# Patient Record
Sex: MALE | Race: WHITE | NOT HISPANIC OR LATINO | Employment: OTHER | URBAN - METROPOLITAN AREA
[De-identification: names, ages, dates, MRNs, and addresses within clinical notes are randomized per-mention and may not be internally consistent; named-entity substitution may affect disease eponyms.]

---

## 2017-06-16 ENCOUNTER — OFFICE VISIT - HEALTHEAST (OUTPATIENT)
Dept: FAMILY MEDICINE | Facility: CLINIC | Age: 67
End: 2017-06-16

## 2017-06-16 DIAGNOSIS — R50.9 FEVER AND CHILLS: ICD-10-CM

## 2017-06-16 ASSESSMENT — MIFFLIN-ST. JEOR: SCORE: 1937.46

## 2017-06-30 ENCOUNTER — OFFICE VISIT - HEALTHEAST (OUTPATIENT)
Dept: FAMILY MEDICINE | Facility: CLINIC | Age: 67
End: 2017-06-30

## 2017-06-30 ENCOUNTER — AMBULATORY - HEALTHEAST (OUTPATIENT)
Dept: FAMILY MEDICINE | Facility: CLINIC | Age: 67
End: 2017-06-30

## 2017-06-30 DIAGNOSIS — R73.01 IMPAIRED FASTING GLUCOSE: ICD-10-CM

## 2017-06-30 DIAGNOSIS — Z12.5 PROSTATE CANCER SCREENING: ICD-10-CM

## 2017-06-30 DIAGNOSIS — Z00.00 ROUTINE GENERAL MEDICAL EXAMINATION AT A HEALTH CARE FACILITY: ICD-10-CM

## 2017-06-30 DIAGNOSIS — R50.9 FEBRILE ILLNESS: ICD-10-CM

## 2017-06-30 DIAGNOSIS — E78.5 HYPERLIPIDEMIA: ICD-10-CM

## 2017-06-30 LAB
CHOLEST SERPL-MCNC: 237 MG/DL
FASTING STATUS PATIENT QL REPORTED: YES
HBA1C MFR BLD: 6.3 % (ref 3.5–6)
HDLC SERPL-MCNC: 39 MG/DL
LDLC SERPL CALC-MCNC: 162 MG/DL
PSA SERPL-MCNC: 1.2 NG/ML (ref 0–4.5)
TRIGL SERPL-MCNC: 181 MG/DL

## 2017-06-30 ASSESSMENT — MIFFLIN-ST. JEOR: SCORE: 1915.68

## 2017-07-04 ENCOUNTER — COMMUNICATION - HEALTHEAST (OUTPATIENT)
Dept: FAMILY MEDICINE | Facility: CLINIC | Age: 67
End: 2017-07-04

## 2017-07-05 ENCOUNTER — COMMUNICATION - HEALTHEAST (OUTPATIENT)
Dept: FAMILY MEDICINE | Facility: CLINIC | Age: 67
End: 2017-07-05

## 2017-10-09 ENCOUNTER — COMMUNICATION - HEALTHEAST (OUTPATIENT)
Dept: LAB | Facility: CLINIC | Age: 67
End: 2017-10-09

## 2017-10-09 DIAGNOSIS — R73.03 PREDIABETES: ICD-10-CM

## 2017-10-09 DIAGNOSIS — E78.5 HYPERLIPIDEMIA: ICD-10-CM

## 2017-10-17 ENCOUNTER — AMBULATORY - HEALTHEAST (OUTPATIENT)
Dept: FAMILY MEDICINE | Facility: CLINIC | Age: 67
End: 2017-10-17

## 2017-10-17 ENCOUNTER — COMMUNICATION - HEALTHEAST (OUTPATIENT)
Dept: FAMILY MEDICINE | Facility: CLINIC | Age: 67
End: 2017-10-17

## 2017-10-17 ENCOUNTER — AMBULATORY - HEALTHEAST (OUTPATIENT)
Dept: LAB | Facility: CLINIC | Age: 67
End: 2017-10-17

## 2017-10-17 ENCOUNTER — AMBULATORY - HEALTHEAST (OUTPATIENT)
Dept: NURSING | Facility: CLINIC | Age: 67
End: 2017-10-17

## 2017-10-17 DIAGNOSIS — E78.5 HYPERLIPIDEMIA: ICD-10-CM

## 2017-10-17 DIAGNOSIS — Z23 NEED FOR VACCINATION: ICD-10-CM

## 2017-10-17 DIAGNOSIS — R73.03 PREDIABETES: ICD-10-CM

## 2017-10-17 LAB
CHOLEST SERPL-MCNC: 259 MG/DL
FASTING STATUS PATIENT QL REPORTED: YES
HBA1C MFR BLD: 6 % (ref 3.5–6)
HDLC SERPL-MCNC: 43 MG/DL
LDLC SERPL CALC-MCNC: 177 MG/DL
TRIGL SERPL-MCNC: 196 MG/DL

## 2017-11-14 ENCOUNTER — RECORDS - HEALTHEAST (OUTPATIENT)
Dept: ADMINISTRATIVE | Facility: OTHER | Age: 67
End: 2017-11-14

## 2017-12-26 ENCOUNTER — OFFICE VISIT - HEALTHEAST (OUTPATIENT)
Dept: FAMILY MEDICINE | Facility: CLINIC | Age: 67
End: 2017-12-26

## 2017-12-26 ENCOUNTER — AMBULATORY - HEALTHEAST (OUTPATIENT)
Dept: FAMILY MEDICINE | Facility: CLINIC | Age: 67
End: 2017-12-26

## 2017-12-26 DIAGNOSIS — G47.33 OSA (OBSTRUCTIVE SLEEP APNEA): ICD-10-CM

## 2017-12-26 DIAGNOSIS — M10.9 GOUT: ICD-10-CM

## 2017-12-26 DIAGNOSIS — E78.5 HYPERLIPIDEMIA: ICD-10-CM

## 2017-12-26 DIAGNOSIS — R73.01 IMPAIRED FASTING GLUCOSE: ICD-10-CM

## 2017-12-26 LAB
CHOLEST SERPL-MCNC: 159 MG/DL
FASTING STATUS PATIENT QL REPORTED: YES
HBA1C MFR BLD: 6.5 % (ref 3.5–6)
HDLC SERPL-MCNC: 32 MG/DL
LDLC SERPL CALC-MCNC: 96 MG/DL
TRIGL SERPL-MCNC: 155 MG/DL

## 2018-03-29 ENCOUNTER — COMMUNICATION - HEALTHEAST (OUTPATIENT)
Dept: FAMILY MEDICINE | Facility: CLINIC | Age: 68
End: 2018-03-29

## 2018-03-29 RX ORDER — NAPROXEN 250 MG/1
TABLET ORAL
Qty: 12 TABLET | Refills: 0 | Status: SHIPPED | OUTPATIENT
Start: 2018-03-29

## 2018-07-10 ENCOUNTER — OFFICE VISIT - HEALTHEAST (OUTPATIENT)
Dept: FAMILY MEDICINE | Facility: CLINIC | Age: 68
End: 2018-07-10

## 2018-07-10 DIAGNOSIS — E78.5 HYPERLIPIDEMIA: ICD-10-CM

## 2018-07-10 DIAGNOSIS — M10.9 ACUTE GOUT, UNSPECIFIED CAUSE, UNSPECIFIED SITE: ICD-10-CM

## 2018-07-10 DIAGNOSIS — Z12.5 SCREENING FOR PROSTATE CANCER: ICD-10-CM

## 2018-07-10 DIAGNOSIS — Z12.11 SCREEN FOR COLON CANCER: ICD-10-CM

## 2018-07-10 DIAGNOSIS — G47.33 OSA (OBSTRUCTIVE SLEEP APNEA): ICD-10-CM

## 2018-07-10 DIAGNOSIS — R73.01 IMPAIRED FASTING GLUCOSE: ICD-10-CM

## 2018-07-10 LAB
ALBUMIN SERPL-MCNC: 4.3 G/DL (ref 3.5–5)
ALP SERPL-CCNC: 83 U/L (ref 45–120)
ALT SERPL W P-5'-P-CCNC: 20 U/L (ref 0–45)
ANION GAP SERPL CALCULATED.3IONS-SCNC: 11 MMOL/L (ref 5–18)
AST SERPL W P-5'-P-CCNC: 20 U/L (ref 0–40)
BILIRUB SERPL-MCNC: 0.8 MG/DL (ref 0–1)
BUN SERPL-MCNC: 16 MG/DL (ref 8–22)
CALCIUM SERPL-MCNC: 10.1 MG/DL (ref 8.5–10.5)
CHLORIDE BLD-SCNC: 106 MMOL/L (ref 98–107)
CHOLEST SERPL-MCNC: 215 MG/DL
CO2 SERPL-SCNC: 26 MMOL/L (ref 22–31)
CREAT SERPL-MCNC: 1.11 MG/DL (ref 0.7–1.3)
FASTING STATUS PATIENT QL REPORTED: YES
GFR SERPL CREATININE-BSD FRML MDRD: >60 ML/MIN/1.73M2
GLUCOSE BLD-MCNC: 102 MG/DL (ref 70–125)
HBA1C MFR BLD: 6.5 % (ref 3.5–6)
HDLC SERPL-MCNC: 43 MG/DL
LDLC SERPL CALC-MCNC: 122 MG/DL
POTASSIUM BLD-SCNC: 4.9 MMOL/L (ref 3.5–5)
PROT SERPL-MCNC: 7 G/DL (ref 6–8)
PSA SERPL-MCNC: 1.2 NG/ML (ref 0–4.5)
SODIUM SERPL-SCNC: 143 MMOL/L (ref 136–145)
TRIGL SERPL-MCNC: 248 MG/DL
URATE SERPL-MCNC: 8.2 MG/DL (ref 3–8)

## 2018-07-10 ASSESSMENT — MIFFLIN-ST. JEOR: SCORE: 1928.38

## 2018-08-02 ENCOUNTER — COMMUNICATION - HEALTHEAST (OUTPATIENT)
Dept: FAMILY MEDICINE | Facility: CLINIC | Age: 68
End: 2018-08-02

## 2018-08-03 ENCOUNTER — COMMUNICATION - HEALTHEAST (OUTPATIENT)
Dept: SCHEDULING | Facility: CLINIC | Age: 68
End: 2018-08-03

## 2018-09-03 ENCOUNTER — COMMUNICATION - HEALTHEAST (OUTPATIENT)
Dept: FAMILY MEDICINE | Facility: CLINIC | Age: 68
End: 2018-09-03

## 2018-09-04 ENCOUNTER — COMMUNICATION - HEALTHEAST (OUTPATIENT)
Dept: SCHEDULING | Facility: CLINIC | Age: 68
End: 2018-09-04

## 2018-09-04 ENCOUNTER — OFFICE VISIT - HEALTHEAST (OUTPATIENT)
Dept: FAMILY MEDICINE | Facility: CLINIC | Age: 68
End: 2018-09-04

## 2018-09-04 ENCOUNTER — RECORDS - HEALTHEAST (OUTPATIENT)
Dept: GENERAL RADIOLOGY | Facility: CLINIC | Age: 68
End: 2018-09-04

## 2018-09-04 DIAGNOSIS — M10.9 GOUT: ICD-10-CM

## 2018-09-04 DIAGNOSIS — M79.672 PAIN IN LEFT FOOT: ICD-10-CM

## 2018-09-04 DIAGNOSIS — M79.672 LEFT FOOT PAIN: ICD-10-CM

## 2018-09-04 DIAGNOSIS — M25.50 ARTHRALGIA: ICD-10-CM

## 2018-09-04 DIAGNOSIS — M79.10 MYALGIA: ICD-10-CM

## 2018-09-04 LAB
C REACTIVE PROTEIN LHE: 5.3 MG/DL (ref 0–0.8)
CK SERPL-CCNC: 179 U/L (ref 30–190)
ERYTHROCYTE [DISTWIDTH] IN BLOOD BY AUTOMATED COUNT: 12.6 % (ref 11–14.5)
ERYTHROCYTE [SEDIMENTATION RATE] IN BLOOD BY WESTERGREN METHOD: 43 MM/HR (ref 0–15)
HCT VFR BLD AUTO: 38.7 % (ref 40–54)
HGB BLD-MCNC: 13.3 G/DL (ref 14–18)
MCH RBC QN AUTO: 30.1 PG (ref 27–34)
MCHC RBC AUTO-ENTMCNC: 34.4 G/DL (ref 32–36)
MCV RBC AUTO: 87 FL (ref 80–100)
PLATELET # BLD AUTO: 236 THOU/UL (ref 140–440)
PMV BLD AUTO: 7.3 FL (ref 7–10)
RBC # BLD AUTO: 4.42 MILL/UL (ref 4.4–6.2)
RHEUMATOID FACT SERPL-ACNC: <15 IU/ML (ref 0–30)
URATE SERPL-MCNC: 5.7 MG/DL (ref 3–8)
WBC: 6.1 THOU/UL (ref 4–11)

## 2018-09-05 ENCOUNTER — COMMUNICATION - HEALTHEAST (OUTPATIENT)
Dept: FAMILY MEDICINE | Facility: CLINIC | Age: 68
End: 2018-09-05

## 2018-09-05 LAB — B BURGDOR IGG+IGM SER QL: 0.02 INDEX VALUE

## 2018-09-06 LAB — ANA SER QL: 0.1 U

## 2018-09-08 LAB
A PHAGOCYTOPH DNA BLD QL NAA+PROBE: NOT DETECTED
E CHAFFEENSIS DNA BLD QL NAA+PROBE: NOT DETECTED
E EWINGII DNA SPEC QL NAA+PROBE: NOT DETECTED
EHRLICHIA DNA SPEC QL NAA+PROBE: NOT DETECTED

## 2018-09-13 ENCOUNTER — COMMUNICATION - HEALTHEAST (OUTPATIENT)
Dept: SCHEDULING | Facility: CLINIC | Age: 68
End: 2018-09-13

## 2018-09-13 ENCOUNTER — COMMUNICATION - HEALTHEAST (OUTPATIENT)
Dept: FAMILY MEDICINE | Facility: CLINIC | Age: 68
End: 2018-09-13

## 2018-09-13 ENCOUNTER — OFFICE VISIT - HEALTHEAST (OUTPATIENT)
Dept: FAMILY MEDICINE | Facility: CLINIC | Age: 68
End: 2018-09-13

## 2018-09-13 ENCOUNTER — RECORDS - HEALTHEAST (OUTPATIENT)
Dept: GENERAL RADIOLOGY | Facility: CLINIC | Age: 68
End: 2018-09-13

## 2018-09-13 DIAGNOSIS — R53.83 OTHER FATIGUE: ICD-10-CM

## 2018-09-13 DIAGNOSIS — G47.33 OSA (OBSTRUCTIVE SLEEP APNEA): ICD-10-CM

## 2018-09-13 DIAGNOSIS — R61 NIGHT SWEATS: ICD-10-CM

## 2018-09-13 DIAGNOSIS — R53.83 FATIGUE, UNSPECIFIED TYPE: ICD-10-CM

## 2018-09-13 DIAGNOSIS — R61 GENERALIZED HYPERHIDROSIS: ICD-10-CM

## 2018-09-13 DIAGNOSIS — M25.50 POLYARTHRALGIA: ICD-10-CM

## 2018-09-13 DIAGNOSIS — M35.3 POLYMYALGIA (H): ICD-10-CM

## 2018-09-13 LAB
ALBUMIN SERPL-MCNC: 3.9 G/DL (ref 3.5–5)
ALBUMIN UR-MCNC: NEGATIVE MG/DL
ALP SERPL-CCNC: 82 U/L (ref 45–120)
ALT SERPL W P-5'-P-CCNC: 19 U/L (ref 0–45)
ANION GAP SERPL CALCULATED.3IONS-SCNC: 10 MMOL/L (ref 5–18)
APPEARANCE UR: CLEAR
AST SERPL W P-5'-P-CCNC: 12 U/L (ref 0–40)
BASOPHILS # BLD AUTO: 0 THOU/UL (ref 0–0.2)
BASOPHILS NFR BLD AUTO: 0 % (ref 0–2)
BILIRUB SERPL-MCNC: 0.6 MG/DL (ref 0–1)
BILIRUB UR QL STRIP: NEGATIVE
BUN SERPL-MCNC: 19 MG/DL (ref 8–22)
CALCIUM SERPL-MCNC: 10.2 MG/DL (ref 8.5–10.5)
CHLORIDE BLD-SCNC: 103 MMOL/L (ref 98–107)
CO2 SERPL-SCNC: 28 MMOL/L (ref 22–31)
COLOR UR AUTO: YELLOW
CREAT SERPL-MCNC: 1.19 MG/DL (ref 0.7–1.3)
EOSINOPHIL # BLD AUTO: 0.2 THOU/UL (ref 0–0.4)
EOSINOPHIL NFR BLD AUTO: 3 % (ref 0–6)
ERYTHROCYTE [DISTWIDTH] IN BLOOD BY AUTOMATED COUNT: 12.7 % (ref 11–14.5)
GFR SERPL CREATININE-BSD FRML MDRD: >60 ML/MIN/1.73M2
GLUCOSE BLD-MCNC: 115 MG/DL (ref 70–125)
GLUCOSE UR STRIP-MCNC: NEGATIVE MG/DL
HCT VFR BLD AUTO: 45 % (ref 40–54)
HGB BLD-MCNC: 15.2 G/DL (ref 14–18)
HGB UR QL STRIP: NEGATIVE
KETONES UR STRIP-MCNC: NEGATIVE MG/DL
LEUKOCYTE ESTERASE UR QL STRIP: NEGATIVE
LYMPHOCYTES # BLD AUTO: 1.6 THOU/UL (ref 0.8–4.4)
LYMPHOCYTES NFR BLD AUTO: 23 % (ref 20–40)
MCH RBC QN AUTO: 30.3 PG (ref 27–34)
MCHC RBC AUTO-ENTMCNC: 33.9 G/DL (ref 32–36)
MCV RBC AUTO: 89 FL (ref 80–100)
MONOCYTES # BLD AUTO: 0.6 THOU/UL (ref 0–0.9)
MONOCYTES NFR BLD AUTO: 9 % (ref 2–10)
NEUTROPHILS # BLD AUTO: 4.6 THOU/UL (ref 2–7.7)
NEUTROPHILS NFR BLD AUTO: 65 % (ref 50–70)
NITRATE UR QL: NEGATIVE
PH UR STRIP: 5.5 [PH] (ref 5–8)
PLATELET # BLD AUTO: 255 THOU/UL (ref 140–440)
PMV BLD AUTO: 7.2 FL (ref 7–10)
POTASSIUM BLD-SCNC: 4.9 MMOL/L (ref 3.5–5)
PROT SERPL-MCNC: 6.7 G/DL (ref 6–8)
RBC # BLD AUTO: 5.03 MILL/UL (ref 4.4–6.2)
SODIUM SERPL-SCNC: 141 MMOL/L (ref 136–145)
SP GR UR STRIP: 1.01 (ref 1–1.03)
TSH SERPL DL<=0.005 MIU/L-ACNC: 1.4 UIU/ML (ref 0.3–5)
UROBILINOGEN UR STRIP-ACNC: NORMAL
WBC: 7 THOU/UL (ref 4–11)

## 2018-09-13 ASSESSMENT — MIFFLIN-ST. JEOR: SCORE: 1922.03

## 2018-09-14 LAB
ATRIAL RATE - MUSE: 55 BPM
DIASTOLIC BLOOD PRESSURE - MUSE: NORMAL MMHG
INTERPRETATION ECG - MUSE: NORMAL
P AXIS - MUSE: 47 DEGREES
PR INTERVAL - MUSE: 152 MS
QRS DURATION - MUSE: 92 MS
QT - MUSE: 412 MS
QTC - MUSE: 394 MS
R AXIS - MUSE: -14 DEGREES
SYSTOLIC BLOOD PRESSURE - MUSE: NORMAL MMHG
T AXIS - MUSE: 43 DEGREES
VENTRICULAR RATE- MUSE: 55 BPM

## 2018-09-15 LAB
B19V IGG SER IA-ACNC: 1.17 IV
B19V IGM SER IA-ACNC: 0.21 IV

## 2018-09-16 LAB
EBV EA-D IGG SER-ACNC: <0.2 AI (ref 0–0.8)
EBV NA IGG SER IA-ACNC: >8 AI (ref 0–0.8)
EBV VCA IGG SER IA-ACNC: 7.2 AI (ref 0–0.8)

## 2018-09-21 ENCOUNTER — COMMUNICATION - HEALTHEAST (OUTPATIENT)
Dept: FAMILY MEDICINE | Facility: CLINIC | Age: 68
End: 2018-09-21

## 2018-10-02 ENCOUNTER — OFFICE VISIT - HEALTHEAST (OUTPATIENT)
Dept: FAMILY MEDICINE | Facility: CLINIC | Age: 68
End: 2018-10-02

## 2018-10-02 ENCOUNTER — AMBULATORY - HEALTHEAST (OUTPATIENT)
Dept: FAMILY MEDICINE | Facility: CLINIC | Age: 68
End: 2018-10-02

## 2018-10-02 DIAGNOSIS — M10.9 GOUT: ICD-10-CM

## 2018-10-02 DIAGNOSIS — R79.82 ELEVATED C-REACTIVE PROTEIN (CRP): ICD-10-CM

## 2018-10-02 DIAGNOSIS — R53.81 MALAISE: ICD-10-CM

## 2018-10-02 LAB
BASOPHILS # BLD AUTO: 0 THOU/UL (ref 0–0.2)
BASOPHILS NFR BLD AUTO: 0 % (ref 0–2)
C REACTIVE PROTEIN LHE: 1.4 MG/DL (ref 0–0.8)
EOSINOPHIL # BLD AUTO: 0.2 THOU/UL (ref 0–0.4)
EOSINOPHIL NFR BLD AUTO: 3 % (ref 0–6)
ERYTHROCYTE [DISTWIDTH] IN BLOOD BY AUTOMATED COUNT: 13 % (ref 11–14.5)
ERYTHROCYTE [SEDIMENTATION RATE] IN BLOOD BY WESTERGREN METHOD: 28 MM/HR (ref 0–15)
HCT VFR BLD AUTO: 42.1 % (ref 40–54)
HGB BLD-MCNC: 14.1 G/DL (ref 14–18)
LYMPHOCYTES # BLD AUTO: 1.3 THOU/UL (ref 0.8–4.4)
LYMPHOCYTES NFR BLD AUTO: 25 % (ref 20–40)
MCH RBC QN AUTO: 29.8 PG (ref 27–34)
MCHC RBC AUTO-ENTMCNC: 33.6 G/DL (ref 32–36)
MCV RBC AUTO: 89 FL (ref 80–100)
MONOCYTES # BLD AUTO: 0.4 THOU/UL (ref 0–0.9)
MONOCYTES NFR BLD AUTO: 8 % (ref 2–10)
NEUTROPHILS # BLD AUTO: 3.3 THOU/UL (ref 2–7.7)
NEUTROPHILS NFR BLD AUTO: 65 % (ref 50–70)
PLATELET # BLD AUTO: 221 THOU/UL (ref 140–440)
PMV BLD AUTO: 7.2 FL (ref 7–10)
RBC # BLD AUTO: 4.75 MILL/UL (ref 4.4–6.2)
WBC: 5.1 THOU/UL (ref 4–11)

## 2018-10-05 ENCOUNTER — COMMUNICATION - HEALTHEAST (OUTPATIENT)
Dept: FAMILY MEDICINE | Facility: CLINIC | Age: 68
End: 2018-10-05

## 2018-10-05 DIAGNOSIS — E78.5 HYPERLIPIDEMIA: ICD-10-CM

## 2018-10-10 ENCOUNTER — OFFICE VISIT - HEALTHEAST (OUTPATIENT)
Dept: RHEUMATOLOGY | Facility: CLINIC | Age: 68
End: 2018-10-10

## 2018-10-10 DIAGNOSIS — M25.50 POLYARTHRALGIA: ICD-10-CM

## 2018-10-10 DIAGNOSIS — M35.3 POLYMYALGIA (H): ICD-10-CM

## 2018-10-10 LAB
ALBUMIN SERPL-MCNC: 4 G/DL (ref 3.5–5)
ALT SERPL W P-5'-P-CCNC: 18 U/L (ref 0–45)
BASOPHILS # BLD AUTO: 0 THOU/UL (ref 0–0.2)
BASOPHILS NFR BLD AUTO: 1 % (ref 0–2)
C REACTIVE PROTEIN LHE: 0.2 MG/DL (ref 0–0.8)
CREAT SERPL-MCNC: 1.06 MG/DL (ref 0.7–1.3)
EOSINOPHIL # BLD AUTO: 0.1 THOU/UL (ref 0–0.4)
EOSINOPHIL NFR BLD AUTO: 2 % (ref 0–6)
ERYTHROCYTE [DISTWIDTH] IN BLOOD BY AUTOMATED COUNT: 13.1 % (ref 11–14.5)
ERYTHROCYTE [SEDIMENTATION RATE] IN BLOOD BY WESTERGREN METHOD: 14 MM/HR (ref 0–15)
GFR SERPL CREATININE-BSD FRML MDRD: >60 ML/MIN/1.73M2
HCT VFR BLD AUTO: 44.8 % (ref 40–54)
HGB BLD-MCNC: 14.4 G/DL (ref 14–18)
LYMPHOCYTES # BLD AUTO: 1.4 THOU/UL (ref 0.8–4.4)
LYMPHOCYTES NFR BLD AUTO: 26 % (ref 20–40)
MCH RBC QN AUTO: 29.8 PG (ref 27–34)
MCHC RBC AUTO-ENTMCNC: 32.1 G/DL (ref 32–36)
MCV RBC AUTO: 93 FL (ref 80–100)
MONOCYTES # BLD AUTO: 0.5 THOU/UL (ref 0–0.9)
MONOCYTES NFR BLD AUTO: 9 % (ref 2–10)
NEUTROPHILS # BLD AUTO: 3.3 THOU/UL (ref 2–7.7)
NEUTROPHILS NFR BLD AUTO: 63 % (ref 50–70)
PLATELET # BLD AUTO: 271 THOU/UL (ref 140–440)
PMV BLD AUTO: 9.7 FL (ref 8.5–12.5)
RBC # BLD AUTO: 4.84 MILL/UL (ref 4.4–6.2)
WBC: 5.3 THOU/UL (ref 4–11)

## 2018-10-10 ASSESSMENT — MIFFLIN-ST. JEOR: SCORE: 1930.2

## 2018-10-11 LAB
CCP AB SER IA-ACNC: 0.6 U/ML
HBV SURFACE AG SERPL QL IA: NEGATIVE
HCV AB SERPL QL IA: NEGATIVE

## 2018-10-12 LAB — ANCA IGG TITR SER IF: NORMAL {TITER}

## 2018-10-14 ENCOUNTER — COMMUNICATION - HEALTHEAST (OUTPATIENT)
Dept: FAMILY MEDICINE | Facility: CLINIC | Age: 68
End: 2018-10-14

## 2018-10-14 DIAGNOSIS — M10.9 GOUT: ICD-10-CM

## 2019-03-13 ENCOUNTER — COMMUNICATION - HEALTHEAST (OUTPATIENT)
Dept: FAMILY MEDICINE | Facility: CLINIC | Age: 69
End: 2019-03-13

## 2019-05-09 ENCOUNTER — OFFICE VISIT - HEALTHEAST (OUTPATIENT)
Dept: RHEUMATOLOGY | Facility: CLINIC | Age: 69
End: 2019-05-09

## 2019-05-09 DIAGNOSIS — M35.3 POLYMYALGIA (H): ICD-10-CM

## 2019-07-08 ENCOUNTER — AMBULATORY - HEALTHEAST (OUTPATIENT)
Dept: FAMILY MEDICINE | Facility: CLINIC | Age: 69
End: 2019-07-08

## 2019-07-15 ENCOUNTER — OFFICE VISIT - HEALTHEAST (OUTPATIENT)
Dept: FAMILY MEDICINE | Facility: CLINIC | Age: 69
End: 2019-07-15

## 2019-07-15 ENCOUNTER — COMMUNICATION - HEALTHEAST (OUTPATIENT)
Dept: FAMILY MEDICINE | Facility: CLINIC | Age: 69
End: 2019-07-15

## 2019-07-15 DIAGNOSIS — R73.01 IMPAIRED FASTING GLUCOSE: ICD-10-CM

## 2019-07-15 DIAGNOSIS — M10.9 GOUT: ICD-10-CM

## 2019-07-15 DIAGNOSIS — T88.1XXS: ICD-10-CM

## 2019-07-15 DIAGNOSIS — Z12.5 SCREENING FOR PROSTATE CANCER: ICD-10-CM

## 2019-07-15 DIAGNOSIS — B35.1 TOENAIL FUNGUS: ICD-10-CM

## 2019-07-15 DIAGNOSIS — E78.5 HYPERLIPIDEMIA: ICD-10-CM

## 2019-07-15 DIAGNOSIS — Z12.11 SCREEN FOR COLON CANCER: ICD-10-CM

## 2019-07-15 LAB
ALBUMIN SERPL-MCNC: 4.3 G/DL (ref 3.5–5)
ALP SERPL-CCNC: 87 U/L (ref 45–120)
ALT SERPL W P-5'-P-CCNC: 27 U/L (ref 0–45)
ANION GAP SERPL CALCULATED.3IONS-SCNC: 11 MMOL/L (ref 5–18)
AST SERPL W P-5'-P-CCNC: 23 U/L (ref 0–40)
BILIRUB SERPL-MCNC: 0.7 MG/DL (ref 0–1)
BUN SERPL-MCNC: 17 MG/DL (ref 8–22)
CALCIUM SERPL-MCNC: 9.9 MG/DL (ref 8.5–10.5)
CHLORIDE BLD-SCNC: 104 MMOL/L (ref 98–107)
CHOLEST SERPL-MCNC: 201 MG/DL
CO2 SERPL-SCNC: 27 MMOL/L (ref 22–31)
CREAT SERPL-MCNC: 1.14 MG/DL (ref 0.7–1.3)
CREAT UR-MCNC: 30.2 MG/DL
FASTING STATUS PATIENT QL REPORTED: YES
GFR SERPL CREATININE-BSD FRML MDRD: >60 ML/MIN/1.73M2
GLUCOSE BLD-MCNC: 126 MG/DL (ref 70–125)
HBA1C MFR BLD: 6.7 % (ref 3.5–6)
HDLC SERPL-MCNC: 44 MG/DL
LDLC SERPL CALC-MCNC: 106 MG/DL
MICROALBUMIN UR-MCNC: <0.5 MG/DL (ref 0–1.99)
MICROALBUMIN/CREAT UR: NORMAL MG/G
POTASSIUM BLD-SCNC: 4.6 MMOL/L (ref 3.5–5)
PROT SERPL-MCNC: 6.6 G/DL (ref 6–8)
PSA SERPL-MCNC: 1.1 NG/ML (ref 0–4.5)
SODIUM SERPL-SCNC: 142 MMOL/L (ref 136–145)
TRIGL SERPL-MCNC: 257 MG/DL
URATE SERPL-MCNC: 5.1 MG/DL (ref 3–8)

## 2019-07-15 RX ORDER — COLCHICINE 0.6 MG/1
0.6 TABLET ORAL DAILY
Qty: 30 TABLET | Refills: 5 | Status: SHIPPED | OUTPATIENT
Start: 2019-07-15 | End: 2022-07-01

## 2019-07-15 ASSESSMENT — MIFFLIN-ST. JEOR: SCORE: 1911.61

## 2019-07-17 ENCOUNTER — COMMUNICATION - HEALTHEAST (OUTPATIENT)
Dept: FAMILY MEDICINE | Facility: CLINIC | Age: 69
End: 2019-07-17

## 2019-08-14 ENCOUNTER — RECORDS - HEALTHEAST (OUTPATIENT)
Dept: ADMINISTRATIVE | Facility: OTHER | Age: 69
End: 2019-08-14

## 2020-08-28 ENCOUNTER — OFFICE VISIT - HEALTHEAST (OUTPATIENT)
Dept: FAMILY MEDICINE | Facility: CLINIC | Age: 70
End: 2020-08-28

## 2020-08-28 DIAGNOSIS — E78.5 HYPERLIPIDEMIA, UNSPECIFIED HYPERLIPIDEMIA TYPE: ICD-10-CM

## 2020-08-28 DIAGNOSIS — M10.9 GOUT, UNSPECIFIED CAUSE, UNSPECIFIED CHRONICITY, UNSPECIFIED SITE: ICD-10-CM

## 2020-08-28 DIAGNOSIS — R73.01 IMPAIRED FASTING GLUCOSE: ICD-10-CM

## 2020-08-28 DIAGNOSIS — Z12.5 SCREENING FOR PROSTATE CANCER: ICD-10-CM

## 2020-08-28 DIAGNOSIS — M77.11 RIGHT LATERAL EPICONDYLITIS: ICD-10-CM

## 2020-08-28 DIAGNOSIS — Z00.00 ROUTINE GENERAL MEDICAL EXAMINATION AT A HEALTH CARE FACILITY: ICD-10-CM

## 2020-08-28 ASSESSMENT — ANXIETY QUESTIONNAIRES
1. FEELING NERVOUS, ANXIOUS, OR ON EDGE: NOT AT ALL
2. NOT BEING ABLE TO STOP OR CONTROL WORRYING: NOT AT ALL

## 2020-08-28 ASSESSMENT — MIFFLIN-ST. JEOR: SCORE: 1885.86

## 2020-08-31 ENCOUNTER — AMBULATORY - HEALTHEAST (OUTPATIENT)
Dept: LAB | Facility: CLINIC | Age: 70
End: 2020-08-31

## 2020-08-31 DIAGNOSIS — M10.9 GOUT, UNSPECIFIED CAUSE, UNSPECIFIED CHRONICITY, UNSPECIFIED SITE: ICD-10-CM

## 2020-08-31 DIAGNOSIS — R73.01 IMPAIRED FASTING GLUCOSE: ICD-10-CM

## 2020-08-31 DIAGNOSIS — E78.5 HYPERLIPIDEMIA, UNSPECIFIED HYPERLIPIDEMIA TYPE: ICD-10-CM

## 2020-08-31 DIAGNOSIS — Z12.5 SCREENING FOR PROSTATE CANCER: ICD-10-CM

## 2020-08-31 LAB
ALBUMIN SERPL-MCNC: 4.1 G/DL (ref 3.5–5)
ALP SERPL-CCNC: 88 U/L (ref 45–120)
ALT SERPL W P-5'-P-CCNC: 21 U/L (ref 0–45)
ANION GAP SERPL CALCULATED.3IONS-SCNC: 12 MMOL/L (ref 5–18)
AST SERPL W P-5'-P-CCNC: 20 U/L (ref 0–40)
BILIRUB SERPL-MCNC: 0.7 MG/DL (ref 0–1)
BUN SERPL-MCNC: 19 MG/DL (ref 8–22)
CALCIUM SERPL-MCNC: 9.6 MG/DL (ref 8.5–10.5)
CHLORIDE BLD-SCNC: 104 MMOL/L (ref 98–107)
CHOLEST SERPL-MCNC: 193 MG/DL
CO2 SERPL-SCNC: 24 MMOL/L (ref 22–31)
CREAT SERPL-MCNC: 1.15 MG/DL (ref 0.7–1.3)
FASTING STATUS PATIENT QL REPORTED: YES
GFR SERPL CREATININE-BSD FRML MDRD: >60 ML/MIN/1.73M2
GLUCOSE BLD-MCNC: 134 MG/DL (ref 70–125)
HBA1C MFR BLD: 6.7 %
HDLC SERPL-MCNC: 44 MG/DL
LDLC SERPL CALC-MCNC: 106 MG/DL
POTASSIUM BLD-SCNC: 4.1 MMOL/L (ref 3.5–5)
PROT SERPL-MCNC: 6.5 G/DL (ref 6–8)
PSA SERPL-MCNC: 1.3 NG/ML (ref 0–4.5)
SODIUM SERPL-SCNC: 140 MMOL/L (ref 136–145)
TRIGL SERPL-MCNC: 214 MG/DL
URATE SERPL-MCNC: 5.3 MG/DL (ref 3–8)

## 2020-09-25 ENCOUNTER — COMMUNICATION - HEALTHEAST (OUTPATIENT)
Dept: FAMILY MEDICINE | Facility: CLINIC | Age: 70
End: 2020-09-25

## 2020-09-25 DIAGNOSIS — E78.5 HYPERLIPIDEMIA: ICD-10-CM

## 2020-09-28 RX ORDER — ATORVASTATIN CALCIUM 10 MG/1
TABLET, FILM COATED ORAL
Qty: 90 TABLET | Refills: 3 | Status: SHIPPED | OUTPATIENT
Start: 2020-09-28 | End: 2021-09-20

## 2020-10-01 ENCOUNTER — COMMUNICATION - HEALTHEAST (OUTPATIENT)
Dept: FAMILY MEDICINE | Facility: CLINIC | Age: 70
End: 2020-10-01

## 2020-10-01 DIAGNOSIS — M10.9 GOUT: ICD-10-CM

## 2020-10-04 ENCOUNTER — COMMUNICATION - HEALTHEAST (OUTPATIENT)
Dept: FAMILY MEDICINE | Facility: CLINIC | Age: 70
End: 2020-10-04

## 2020-10-04 DIAGNOSIS — M10.9 GOUT: ICD-10-CM

## 2020-10-05 RX ORDER — ALLOPURINOL 100 MG/1
200 TABLET ORAL DAILY
Qty: 180 TABLET | Refills: 3 | Status: SHIPPED | OUTPATIENT
Start: 2020-10-05 | End: 2022-07-01

## 2020-10-08 ENCOUNTER — RECORDS - HEALTHEAST (OUTPATIENT)
Dept: ADMINISTRATIVE | Facility: OTHER | Age: 70
End: 2020-10-08

## 2021-05-30 NOTE — PATIENT INSTRUCTIONS - HE
Advance Directive  Patient s advance directive was discussed and I am comfortable with the patient s wishes.  Patient Education   Personalized Prevention Plan  You are due for the preventive services outlined below.  Your care team is available to assist you in scheduling these services.  If you have already completed any of these items, please share that information with your care team to update in your medical record.  Health Maintenance   Topic Date Due     ZOSTER VACCINES (3 of 3) 02/26/2019     INFLUENZA VACCINE RULE BASED (1) 08/01/2019     FALL RISK ASSESSMENT  09/04/2019     COLOGUARD  08/01/2021     ADVANCE DIRECTIVES DISCUSSED WITH PATIENT  07/10/2023     TD 18+ HE  10/15/2028     PNEUMOCOCCAL POLYSACCHARIDE VACCINE AGE 65 AND OVER  Completed     PNEUMOCOCCAL CONJUGATE VACCINE FOR ADULTS (PCV13 OR PREVNAR)  Completed

## 2021-05-30 NOTE — PROGRESS NOTES
Assessment and Plan:     Evaristo was seen today for annual wellness visit, nail problem and gout.    Screen for colon cancer  -     Ambulatory referral for Colonoscopy  -     Microalbumin, Random Urine    Hyperlipidemia  -     Lipid Cascade  -     Comprehensive Metabolic Panel  -     atorvastatin (LIPITOR) 10 MG tablet; Take 1 tablet (10 mg total) by mouth daily.  -     Microalbumin, Random Urine    Impaired fasting glucose  -     Glycosylated Hemoglobin A1c  -     Microalbumin, Random Urine    Screening for prostate cancer  -     PSA, Annual Screen (Prostatic-Specific Antigen)  -     Microalbumin, Random Urine    Gout  -     Uric Acid  -     allopurinol (ZYLOPRIM) 100 MG tablet; Take 2 tablets (200 mg total) by mouth daily.  -     colchicine 0.6 mg tablet; Take 1 tablet (0.6 mg total) by mouth daily.  -     Microalbumin, Random Urine    Toenail fungus    Reaction to varicella immunization, sequela      The patient's current medical problems were reviewed.    The following high BMI interventions were performed this visit: encouragement to exercise and weight monitoring  The following health maintenance schedule was reviewed with the patient and provided in printed form in the after visit summary:   Health Maintenance   Topic Date Due     ZOSTER VACCINES (3 of 3) 02/26/2019     INFLUENZA VACCINE RULE BASED (1) 08/01/2019     FALL RISK ASSESSMENT  09/04/2019     COLOGUARD  08/01/2021     ADVANCE DIRECTIVES DISCUSSED WITH PATIENT  07/10/2023     TD 18+ HE  10/15/2028     PNEUMOCOCCAL POLYSACCHARIDE VACCINE AGE 65 AND OVER  Completed     PNEUMOCOCCAL CONJUGATE VACCINE FOR ADULTS (PCV13 OR PREVNAR)  Completed        Subjective:   Chief Complaint: Evaristo Lindsey is an 69 y.o. male here for an Annual Wellness visit.   HPI: He reports he is overall doing well.  He has gout no recent attack is on suppression therapy.  He has sleep apnea uses CPAP with good symptom relief follows with sleep specialist.  Has upcoming  appointment dermatology for screening.  Has prediabetes with most recent A1c of 6.5 obtained 1 year ago.  Discussed the significance of this.  Patient inquired about continued use of aspirin, will wait for repeat blood sugar testing.  Discussed diet and exercise.  No symptoms of hyperglycemia.  Has toenail fungus affecting both great toes discussed extensively today.  Had a reaction to shingles vaccine.  Discussed extensively, elected to hold off on obtaining second shot at this point, discussed the uncertainty regarding the degree of protection from one vaccine alone he understands this.  He reports that he had fever and flulike symptoms that were significant lasting for several days, he reports a cousin with similar more serious reaction.  Patient has a history of periodic bouts of fatigue, fever, malaise and myalgias.  He had been evaluated by rheumatology this past year on 2 occasions and has not had any recurrence.  This is discussed and reviewed in his chart.      Review of Systems: Please see above.  The rest of the review of systems are negative for all systems.    Patient Care Team:  Guillermo Wharton MD as PCP - General     Patient Active Problem List   Diagnosis     Acrochordon     Impaired Fasting Glucose     MANUEL (obstructive sleep apnea)     Hyperlipidemia     Obstructive sleep apnea     Polymyalgia (H)     Polyarthralgia     Past Medical History:   Diagnosis Date     Cholelithiasis      Hyperlipidemia      Impaired fasting glucose      Kidney stone     30 yrs ago     Prehypertension      Sleep apnea     CPAP      Past Surgical History:   Procedure Laterality Date     LAPAROSCOPIC CHOLECYSTECTOMY N/A 2/12/2015    Procedure: CHOLECYSTECTOMY LAPAROSCOPIC;  Surgeon: Elayne Husain MD;  Location: [facility] OR;  Service:      none      none      Family History   Problem Relation Age of Onset     Diabetes Mother      Diabetes Maternal Grandmother      Diabetes Maternal Grandfather       Social History      Socioeconomic History     Marital status:      Spouse name: Not on file     Number of children: Not on file     Years of education: Not on file     Highest education level: Not on file   Occupational History     Not on file   Social Needs     Financial resource strain: Not on file     Food insecurity:     Worry: Not on file     Inability: Not on file     Transportation needs:     Medical: Not on file     Non-medical: Not on file   Tobacco Use     Smoking status: Never Smoker     Smokeless tobacco: Never Used   Substance and Sexual Activity     Alcohol use: Yes     Comment: rarely     Drug use: No     Sexual activity: Not on file   Lifestyle     Physical activity:     Days per week: Not on file     Minutes per session: Not on file     Stress: Not on file   Relationships     Social connections:     Talks on phone: Not on file     Gets together: Not on file     Attends Sabianism service: Not on file     Active member of club or organization: Not on file     Attends meetings of clubs or organizations: Not on file     Relationship status: Not on file     Intimate partner violence:     Fear of current or ex partner: Not on file     Emotionally abused: Not on file     Physically abused: Not on file     Forced sexual activity: Not on file   Other Topics Concern     Not on file   Social History Narrative     Not on file      Current Outpatient Medications   Medication Sig Dispense Refill     allopurinol (ZYLOPRIM) 100 MG tablet Take 2 tablets (200 mg total) by mouth daily. 180 tablet 3     aspirin 81 MG EC tablet Take 81 mg by mouth daily.       atorvastatin (LIPITOR) 10 MG tablet Take 1 tablet (10 mg total) by mouth daily. 90 tablet 3     FOLIC ACID/MULTIVITS-MIN/LUT (CENTRUM SILVER ORAL) Take 1 tablet by mouth daily.        colchicine 0.6 mg tablet Take 1 tablet (0.6 mg total) by mouth daily. 30 tablet 5     naproxen (NAPROSYN) 250 MG tablet Initially, take 3 tabs (750 mg) x 1 dose by mouth for pain then 250 mg  "every 8 hours as needed until attack resolves. 12 tablet 0     No current facility-administered medications for this visit.       Objective:   Vital Signs:   Visit Vitals  /78   Pulse 63   Ht 5' 11\" (1.803 m)   Wt (!) 246 lb 12.8 oz (111.9 kg)   SpO2 97%   BMI 34.42 kg/m       Wt Readings from Last 3 Encounters:   07/15/19 (!) 246 lb 12.8 oz (111.9 kg)   05/09/19 (!) 255 lb (115.7 kg)   10/10/18 (!) 247 lb 6.4 oz (112.2 kg)       VisionScreening:  No exam data present     PHYSICAL EXAM    General Appearance:    Alert, cooperative, no distress   Eyes:   No scleral icterus or conjunctival irritation       Ears:    Normal TM's and external ear canals, both ears   Throat:   Lips, mucosa, and tongue normal; teeth and gums normal   Neck:   Supple, symmetrical, trachea midline, no adenopathy;        thyroid:  No enlargement/tenderness/nodules   Lungs:     Clear to auscultation bilaterally, respirations unlabored, no wheezes or crackles   Heart:    Regular rate and rhythm,  No murmur   Abdomen:    Soft, no distention, no tenderness on palpation, no masses, no organomegaly     Extremities:  No edema, no joint swelling or redness, no evidence of any injuries   Skin:  No concerning skin findings, no suspicious moles, no rashes   Neurologic:  On gross examination there is no motor or sensory deficit.  Patient walks with a normal gait     Genitalia:   Normal testicular anatomy, no inguinal hernias, no skin findings in the genital region   Rectal:    Normal tone, no hemorrhoids masses or other anal rectal findings, prostate has a smooth uniform consistency without nodules     Assessment Results 7/15/2019   Activities of Daily Living No help needed   Instrumental Activities of Daily Living No help needed   Mini Cog Total Score 5   Some recent data might be hidden     A Mini-Cog score of 0-2 suggests the possibility of dementia, score of 3-5 suggests no dementia    Identified Health Risks:     Patient's advanced directive was " discussed and I am comfortable with the patient's wishes.

## 2021-05-31 VITALS — BODY MASS INDEX: 33.72 KG/M2 | HEIGHT: 72 IN | WEIGHT: 249 LBS

## 2021-05-31 VITALS — HEIGHT: 72 IN | WEIGHT: 244.2 LBS | BODY MASS INDEX: 33.08 KG/M2

## 2021-05-31 VITALS — BODY MASS INDEX: 31.33 KG/M2 | WEIGHT: 231 LBS

## 2021-06-01 ENCOUNTER — RECORDS - HEALTHEAST (OUTPATIENT)
Dept: ADMINISTRATIVE | Facility: CLINIC | Age: 71
End: 2021-06-01

## 2021-06-01 VITALS — WEIGHT: 247 LBS | BODY MASS INDEX: 33.46 KG/M2 | HEIGHT: 72 IN

## 2021-06-01 VITALS — WEIGHT: 245 LBS | BODY MASS INDEX: 33.23 KG/M2

## 2021-06-01 VITALS — HEIGHT: 72 IN | BODY MASS INDEX: 33.51 KG/M2 | WEIGHT: 247.4 LBS

## 2021-06-01 VITALS — BODY MASS INDEX: 33.26 KG/M2 | WEIGHT: 245.6 LBS | HEIGHT: 72 IN

## 2021-06-01 VITALS — BODY MASS INDEX: 34.58 KG/M2 | WEIGHT: 255 LBS

## 2021-06-02 VITALS — WEIGHT: 255 LBS | BODY MASS INDEX: 34.58 KG/M2

## 2021-06-03 VITALS — HEIGHT: 71 IN | WEIGHT: 246.8 LBS | BODY MASS INDEX: 34.55 KG/M2

## 2021-06-04 VITALS
HEIGHT: 71 IN | HEART RATE: 67 BPM | DIASTOLIC BLOOD PRESSURE: 76 MMHG | BODY MASS INDEX: 33.88 KG/M2 | WEIGHT: 242 LBS | SYSTOLIC BLOOD PRESSURE: 134 MMHG | OXYGEN SATURATION: 97 %

## 2021-06-11 NOTE — PROGRESS NOTES
Assessment:     1. Fever and chills  Lyme Antibody Cascade    Erythrocyte Sedimentation Rate    HM1(CBC and Differential)    HM1 (CBC with Diff)    Ambulatory referral to Infectious Disease    Comprehensive Metabolic Panel       Plan:     1. Fever and chills  Preliminary workup as follows; based on past history of similar illness will authorize referral to infectious disease  - Lyme Antibody Cascade  - Erythrocyte Sedimentation Rate  - HM1(CBC and Differential)  - HM1 (CBC with Diff)  - Ambulatory referral to Infectious Disease  - Comprehensive Metabolic Panel      Subjective:   My first visit with Evaristo and his wife patient has had fever and chills for about a week now the patient does come into contact with wood ticks and Lyme techs cutting grass and traveling in the [place] patient has had a similar illness 6 or 7 years ago where he developed chills and a low-grade temp was seen by my partner Dr. Wharton was worked up for Lyme's disease and consult was obtained by infectious disease which was negative.  Patient was ultimately treated with doxycycline by Dr. Wharton and symptoms went away patient has been well since that time but now is experiencing similar body aches shakes and intermittent chills no lumps swellings or rash however no bleeding from anywhere no night sweats.  Tuberculosis test has been negative.  Patient has not travel to tropical countries.  No excessive mosquito contact outside of the country.  Suspicion for malaria quite low.  This is probably a viral illness although babesiosis ehrlichiosis and Lyme's disease or possibility of other infectious disease exist workup will be to look at inflammatory markers hemogram proteins and referral to infectious disease prior to any treatment patient understands all medical questions that were asked were answered this was my    Review of Systems: A complete 14 point review of systems was obtained and is negative or as stated in the history of present  illness.    Past Medical History:   Diagnosis Date     Cholelithiasis      Hyperlipidemia      Impaired fasting glucose      Kidney stone     30 yrs ago     Prehypertension      Sleep apnea     CPAP     Family History   Problem Relation Age of Onset     Diabetes Mother      Diabetes Maternal Grandmother      Diabetes Maternal Grandfather      Past Surgical History:   Procedure Laterality Date     LAPAROSCOPIC CHOLECYSTECTOMY N/A 2/12/2015    Procedure: CHOLECYSTECTOMY LAPAROSCOPIC;  Surgeon: Elayne Husain MD;  Location: [facility];  Service:      none      none     Social History   Substance Use Topics     Smoking status: Never Smoker     Smokeless tobacco: Never Used     Alcohol use Yes      Comment: rarely         Objective:   /78  Pulse 61  Temp 98.1  F (36.7  C)  Ht 6' (1.829 m)  Wt (!) 249 lb (112.9 kg)  SpO2 97%  BMI 33.77 kg/m2    General Appearance:  Alert, cooperative, no distress  Head:  Normocephalic, no obvious abnormality  Ears: TM anatomy normal  Eyes:  PERRL, EOM's intact, conjunctiva and corneas clear  Nose:  Nares symmetrical, septum midline, mucosa pink, no sinus tenderness  Throat:  Lips, tongue, and mucosa are moist, pink, and intact  Neck:  Supple, symmetrical, trachea midline, no adenopathy; thyroid: no enlargement, symmetric,no tenderness/mass/nodules; no carotid bruit, no JVD  Back:  Symmetrical, no curvature, ROM normal, no CVA tenderness  Chest/Breast:  No mass or tenderness  Lungs:  Clear to auscultation bilaterally, respirations unlabored   Heart:  Normal PMI, regular rate & rhythm, S1 and S2 normal, no murmurs, rubs, or gallops  Abdomen:  Soft, non-tender, bowel sounds active all four quadrants, no mass, or organomegaly  Musculoskeletal:  Tone and strength strong and symmetrical, all extremities  Lymphatic:  No adenopathy  Skin/Hair/Nails:  Skin warm, dry, and intact, no rashes  Neurologic:  Alert and oriented x3, no cranial nerve deficits, normal strength and tone,  gait steady  Extremities:  No edema.  Robson's sign negative.    Genitourinary: deferred  Pulses:  Equal bilaterally           This note has been dictated using voice recognition software. Any grammatical or context distortions are unintentional and inherent to the the software.

## 2021-06-11 NOTE — PROGRESS NOTES
Patient ID: Evaristo Lindsey is a 67 y.o. male.  /74  Pulse 64  Ht 6' (1.829 m)  Wt (!) 244 lb 3.2 oz (110.8 kg)  SpO2 98%  BMI 33.12 kg/m2    Assessment/Plan:                Diagnoses and all orders for this visit:    Routine general medical examination at a health care facility    Hyperlipidemia  -     Lipid Cascade FASTING    Febrile illness  -     Lyme Antibody [place]    Prostate cancer screening  -     PSA, Annual Screen (Prostatic-Specific Antigen)    Impaired fasting glucose  -     Basic Metabolic Panel  -     Glycosylated Hemoglobin A1c    Other orders  -     Pneumococcal conjugate vaccine 13-valent 6wks-17yrs; >50yrs      DISCUSSION  Follow-up with additional lab testing to follow-up on elevated cholesterol, prostate cancer screening, renal insufficiency noted on recent lab testing.  Obtain a repeat Lyme antibody cascade to follow-up on his recent febrile illness of unknown cause.  I think based on the clinical history Lyme disease must be a consideration but there is nothing that would point to this specifically.  Based on the clinical scenario I think retesting given it has been 2 weeks since the last test is worthwhile.  Based on the test results we will give consideration to either retesting or since he is recovered no further action.  Pneumococcal vaccine today.  Reviewed other routine health preventive information as noted.    His rash in the groin area that is consistent with tinea cruris.  Recommended topical antifungal medication.  Subjective:     HPI    Evaristo Lindsey is a 67-year-old man generally healthy.  He has obstructive sleep apnea follows with sleep specialist.  Has a history of gallbladder removal for cholelithiasis several years ago.  Has a noted benign hemangioma that was evaluated with MR imaging.  Abnormality discovered at the time of his gallbladder surgery.  He does have a history of impaired fasting glucose.  There have been concerns at dental visits and eye care  visits of slight elevations in blood pressure but generally speaking here he has been normal.  He was seen recently for a febrile illness.  This has been going on for a week before his visit.  Lyme disease was a consideration.  Testing was obtained, he was not treated.  He subsequently recovered rather quickly.  He no longer reports any symptoms of illness.  He is very concerned about Lyme disease as he spends a lot of time outdoors.  We discussed this consideration in depth today.  There is no indication for any specific treatment.  Discussed retesting of Lyme antibody given the clinical scenario.  He does bring up that he had a rash in his groin area.  No other significant concerns.  Discussed prostate cancer screening in depth today.  Noted family history of prostate cancer in his father at approximately age 70.    Review of Systems  Complete review of systems is obtained.  Other than the specific considerations noted above complete review of systems is negative.        Objective:   Medications:  Current Outpatient Prescriptions   Medication Sig     aspirin 81 MG EC tablet Take 81 mg by mouth daily.     FOLIC ACID/MULTIVITS-MIN/LUT (CENTRUM SILVER ORAL) Take 1 tablet by mouth daily.      Allergies:  No Known Allergies    Tobacco:   reports that he has never smoked. He has never used smokeless tobacco.    HEALTH PREVENTION    General  Dental care: Discussed the importance of regular dental care.  Eye care: Discussed importance of routine eye exams for glaucoma screening      Wt Readings from Last 3 Encounters:   06/30/17 (!) 244 lb 3.2 oz (110.8 kg)   06/16/17 (!) 249 lb (112.9 kg)   09/21/15 (!) 249 lb 4.8 oz (113.1 kg)     Body mass index is 33.12 kg/(m^2).    The following high BMI interventions were performed this visit: encouragement to exercise and weight monitoring    Cancer screening  Testicular cancer:is discussed and exam performed today  Skin cancer: Discussed sun burn prevention and self  monitoring.  Colon cancer: Colon cancer screening is discussed.  Colonoscopy is due in 2018.  Prostate cancer: Discussed in depth continue with PSA and digital rectal exam.    Cholesterol:   LDL Calculated   Date Value   10/07/2014      Comment:     Invalid, Triglycerides >300   02/11/2013   Invalid, Triglyceride >300      Blood Pressure:   BP Readings from Last 3 Encounters:   06/30/17 122/74   06/16/17 130/78   09/21/15 124/76     Immunization History   Administered Date(s) Administered     DT (pediatric) 04/01/1999     Hep A, historic 10/31/2008, 02/11/2013     Influenza, inj, historic 10/31/2008, 08/24/2015     Influenza, seasonal,quad inj 6-35 mos 10/01/2012, 10/01/2014     Tdap 10/31/2008     ZOSTER 10/21/2014     There are no preventive care reminders to display for this patient.     Physical Exam      /74  Pulse 64  Ht 6' (1.829 m)  Wt (!) 244 lb 3.2 oz (110.8 kg)  SpO2 98%  BMI 33.12 kg/m2    General Appearance:    Alert, cooperative, no distress, appears stated age   Head:    Normocephalic, without obvious abnormality, atraumatic   Eyes:    PERRL, conjunctiva/corneas clear, EOM's intact       Ears:    Normal TM's and external ear canals, both ears   Nose:   Nares normal, septum midline, mucosa normal, no drainage    or sinus tenderness   Throat:   Lips, mucosa, and tongue normal; teeth and gums normal   Neck:   Supple, symmetrical, trachea midline, no adenopathy;        thyroid:  No enlargement/tenderness/nodules   Lungs:     Clear to auscultation bilaterally, respirations unlabored   Chest wall/ Breast:    No tenderness or deformity   Heart:    Regular rate and rhythm, S1 and S2 normal, no murmur, rub   or gallop   Abdomen:     Soft, non-tender, bowel sounds active all four quadrants,     no masses, no organomegaly   Genitalia:   normal testicular anatomy no inguinal hernias.  Tinea cruris noted on the left side of the groin region.  No significant irritation associated with this.     Rectal:    smooth uniform consistency of the prostate no nodules or other abnormalities.     Extremities:   Extremities normal, atraumatic, no cyanosis or edema   Pulses:   2+ and symmetric all extremities   Skin:   Skin color, texture, turgor normal, no rashes or lesions   Neurologic:   CNII-XII intact. Normal strength, sensation and reflexes       throughout

## 2021-06-11 NOTE — PATIENT INSTRUCTIONS - HE
Advance Directive  Patient s advance directive was discussed and I am comfortable with the patient s wishes.  Patient Education   Personalized Prevention Plan  You are due for the preventive services outlined below.  Your care team is available to assist you in scheduling these services.  If you have already completed any of these items, please share that information with your care team to update in your medical record.  Health Maintenance   Topic Date Due     ZOSTER VACCINES (3 of 3) 02/26/2019     MEDICARE ANNUAL WELLNESS VISIT  07/15/2020     FALL RISK ASSESSMENT  07/15/2020     INFLUENZA VACCINE RULE BASED (1) 08/01/2020     LIPID  07/15/2024     ADVANCE CARE PLANNING  07/15/2024     TD 18+ HE  10/15/2028     HEPATITIS C SCREENING  Completed     PNEUMOCOCCAL IMMUNIZATION 65+ LOW/MEDIUM RISK  Completed     HEPATITIS B VACCINES  Aged Out     COLORECTAL CANCER SCREENING  Discontinued

## 2021-06-11 NOTE — TELEPHONE ENCOUNTER
Refill Approved    Rx renewed per Medication Renewal Policy. Medication was last renewed on 7/15/19.    Shelley Correa, [facility] Triage/Med Refill 9/28/2020     Requested Prescriptions   Pending Prescriptions Disp Refills     atorvastatin (LIPITOR) 10 MG tablet [Pharmacy Med Name: ATORVASTATIN 10 MG TABLET] 90 tablet 0     Sig: TAKE 1 TABLET BY MOUTH EVERY DAY       Statins Refill Protocol (Hmg CoA Reductase Inhibitors) Passed - 9/25/2020  9:04 AM        Passed - PCP or prescribing provider visit in past 12 months      Last office visit with prescriber/PCP: 10/2/2018 Guillermo Wharton MD OR same dept: Visit date not found OR same specialty: 10/2/2018 Guillermo Wharton MD  Last physical: 8/28/2020 Last MTM visit: Visit date not found   Next visit within 3 mo: Visit date not found  Next physical within 3 mo: Visit date not found  Prescriber OR PCP: Guillermo Wharton MD  Last diagnosis associated with med order: 1. Hyperlipidemia  - atorvastatin (LIPITOR) 10 MG tablet [Pharmacy Med Name: ATORVASTATIN 10 MG TABLET]; TAKE 1 TABLET BY MOUTH EVERY DAY  Dispense: 90 tablet; Refill: 0    If protocol passes may refill for 12 months if within 3 months of last provider visit (or a total of 15 months).

## 2021-06-11 NOTE — PROGRESS NOTES
Assessment and Plan:     Evaristo was seen today for annual wellness visit and elbow pain.    Hyperlipidemia, unspecified hyperlipidemia type  -     Comprehensive Metabolic Panel; Future  -     Lipid Cascade; Future    Impaired fasting glucose  -     Glycosylated Hemoglobin A1c; Future    Screening for prostate cancer  -     PSA, Annual Screen (Prostatic-Specific Antigen); Future    Gout, unspecified cause, unspecified chronicity, unspecified site  -     Uric Acid; Future    Right lateral epicondylitis      The patient's current medical problems were reviewed.    The following high BMI interventions were performed this visit: encouragement to exercise  The following health maintenance schedule was reviewed with the patient and provided in printed form in the after visit summary:   Health Maintenance   Topic Date Due     ZOSTER VACCINES (3 of 3) 02/26/2019     MEDICARE ANNUAL WELLNESS VISIT  07/15/2020     FALL RISK ASSESSMENT  07/15/2020     INFLUENZA VACCINE RULE BASED (1) 08/01/2020     LIPID  07/15/2024     ADVANCE CARE PLANNING  07/15/2024     TD 18+ HE  10/15/2028     HEPATITIS C SCREENING  Completed     PNEUMOCOCCAL IMMUNIZATION 65+ LOW/MEDIUM RISK  Completed     HEPATITIS B VACCINES  Aged Out     COLORECTAL CANCER SCREENING  Discontinued        Subjective:   Chief Complaint: Evaristo Lindsey is an 70 y.o. male here for an Annual Wellness visit.   HPI: He is here today for annual wellness visit.  He has A1c reading consistent with low-level diabetes currently diet-controlled.  Patient has been encouraged to follow-up more regularly for recheck of A1c and blood sugar readings but we have not checked since last year.  His last A1c was 6.7.  He has had several readings at 6.5 and a single reading at 6.7.  He does get yearly eye exams he denies any concerns for neuropathy.  We have previously done microalbumin testing he has no history of kidney disease or vascular disease specifically.  He is on appropriate  medications for risk reduction.  He would benefit from increasing dose of statin to further reduce risk based on the blood sugar concerns discussed.    He has sleep apnea follows with a care provider in the [facility].    More recently has been dealing with right lateral epicondylitis.  Discussed in some detail self-directed therapy to help manage the situation.      He has a history of gout he does not report any outbreaks in the past year.  Reviewed routine health preventive measures including discussing prostate cancer screening and colon cancer screening.  Reviewed immunizations.    Review of Systems: Please see above.  The rest of the review of systems are negative for all systems.    Patient Care Team:  Guillermo Wharton MD as PCP - General  Guillermo Wharton MD as Assigned PCP     Patient Active Problem List   Diagnosis     Acrochordon     Impaired Fasting Glucose     MANUEL (obstructive sleep apnea)     Hyperlipidemia     Obstructive sleep apnea     Polymyalgia (H)     Polyarthralgia     Past Medical History:   Diagnosis Date     Cholelithiasis      Hyperlipidemia      Impaired fasting glucose      Kidney stone     30 yrs ago     Prehypertension      Sleep apnea     CPAP      Past Surgical History:   Procedure Laterality Date     LAPAROSCOPIC CHOLECYSTECTOMY N/A 2/12/2015    Procedure: CHOLECYSTECTOMY LAPAROSCOPIC;  Surgeon: Elayne Husain MD;  Location: [facility] [place];  Service:      none      none      Family History   Problem Relation Age of Onset     Diabetes Mother      Diabetes Maternal Grandmother      Diabetes Maternal Grandfather       Social History     Socioeconomic History     Marital status:      Spouse name: Not on file     Number of children: Not on file     Years of education: Not on file     Highest education level: Not on file   Occupational History     Not on file   Social Needs     Financial resource strain: Not on file     Food insecurity     Worry: Not on file      "Inability: Not on file     Transportation needs     Medical: Not on file     Non-medical: Not on file   Tobacco Use     Smoking status: Never Smoker     Smokeless tobacco: Never Used   Substance and Sexual Activity     Alcohol use: Yes     Comment: rarely     Drug use: No     Sexual activity: Not on file   Lifestyle     Physical activity     Days per week: Not on file     Minutes per session: Not on file     Stress: Not on file   Relationships     Social connections     Talks on phone: Not on file     Gets together: Not on file     Attends Scientologist service: Not on file     Active member of club or organization: Not on file     Attends meetings of clubs or organizations: Not on file     Relationship status: Not on file     Intimate partner violence     Fear of current or ex partner: Not on file     Emotionally abused: Not on file     Physically abused: Not on file     Forced sexual activity: Not on file   Other Topics Concern     Not on file   Social History Narrative     Not on file      Current Outpatient Medications   Medication Sig Dispense Refill     allopurinol (ZYLOPRIM) 100 MG tablet Take 2 tablets (200 mg total) by mouth daily. 180 tablet 3     aspirin 81 MG EC tablet Take 81 mg by mouth daily.       atorvastatin (LIPITOR) 10 MG tablet Take 1 tablet (10 mg total) by mouth daily. 90 tablet 3     colchicine 0.6 mg tablet Take 1 tablet (0.6 mg total) by mouth daily. 30 tablet 5     FOLIC ACID/MULTIVITS-MIN/LUT (CENTRUM SILVER ORAL) Take 1 tablet by mouth daily.        naproxen (NAPROSYN) 250 MG tablet Initially, take 3 tabs (750 mg) x 1 dose by mouth for pain then 250 mg every 8 hours as needed until attack resolves. 12 tablet 0     No current facility-administered medications for this visit.       Objective:   Vital Signs:   Visit Vitals  /76   Pulse 67   Ht 5' 10.75\" (1.797 m)   Wt (!) 242 lb (109.8 kg)   SpO2 97%   BMI 33.99 kg/m       VisionScreening:  No exam data present     PHYSICAL EXAM  General " Appearance:    Alert, cooperative, no distress   Eyes:   No scleral icterus or conjunctival irritation       Ears:    Normal TM's and external ear canals, both ears   Throat:   Lips, mucosa, and tongue normal; teeth and gums normal   Neck:   Supple, symmetrical, trachea midline, no adenopathy;        thyroid:  No enlargement/tenderness/nodules   Lungs:     Clear to auscultation bilaterally, respirations unlabored, no wheezes or crackles   Heart:    Regular rate and rhythm,  No murmur   Abdomen:    Soft, no distention, no tenderness on palpation, no masses, no organomegaly     Extremities:  No edema, no joint swelling or redness, no evidence of any injuries   Skin:  No concerning skin findings, no suspicious moles, no rashes   Neurologic:  On gross examination there is no motor or sensory deficit.  Patient walks with a normal gait     Genitalia:   Normal testicular anatomy, no inguinal hernias, no skin findings in the genital region   Rectal:    Normal tone, no hemorrhoids masses or other anal rectal findings, prostate has a smooth uniform consistency without nodules       Assessment Results 8/28/2020   Activities of Daily Living No help needed   Instrumental Activities of Daily Living No help needed   Mini Cog Total Score 5   Some recent data might be hidden     A Mini-Cog score of 0-2 suggests the possibility of dementia, score of 3-5 suggests no dementia    Identified Health Risks:     Patient's advanced directive was discussed and I am comfortable with the patient's wishes.

## 2021-06-15 NOTE — PROGRESS NOTES
Patient ID: Evaristo Lindsey is a 67 y.o. male.  /68  Pulse (!) 57  Wt (!) 231 lb (104.8 kg)  SpO2 97%  BMI 31.33 kg/m2    Assessment/Plan:                Diagnoses and all orders for this visit:    Hyperlipidemia  -     Comprehensive Metabolic Panel  -     Lipid [place] FASTING    Impaired fasting glucose  -     Comprehensive Metabolic Panel  -     Glycosylated Hemoglobin A1c    Gout  -     Uric Acid    MANUEL (obstructive sleep apnea)    Other orders  -     Cancel: Pneumococcal polysaccharide vaccine 23-valent greater than or equal to 3yo subcutaneous/IM      DISCUSSION  Obtain labs as above see additional discussion below.  Subjective:     HPI    Evaristo Lindsey is a 67 y.o. male who is here today with his wife to follow-up on gout and hyperlipidemia.  He was started on atorvastatin this past fall.  He has had elevated LDL cholesterol readings in the range of 170.  He has no personal history of vascular disease.  He continues to deny any symptoms.  He has remained active his weight is down.  Blood pressure is ideal.  He takes baby aspirin daily.  He does have a history of a prior gout outbreak.  There was no confirmation with joint fluid analysis but he had pain redness and swelling in the left great toe and an elevated serum uric acid test back in 2015.  He is also noted to have a strong family history of gout in both a father and brother.  This past November he had an outbreak of a similar nature.  [facility] and provided a prescription with colchicine and naproxen.  Patient reports with taking the colchicine symptoms resolved quickly.  Discussed reevaluation of serum uric acid level and consideration of suppressive therapy at some point in the future.  Reviewed considerations in terms of hydration and diet that may help to prevent gout.    Review of Systems  Complete review of systems is obtained.  Other than the specific considerations noted above complete  review of systems is negative.      Objective:   Medications:  Current Outpatient Prescriptions   Medication Sig Note     aspirin 81 MG EC tablet Take 81 mg by mouth daily.      atorvastatin (LIPITOR) 10 MG tablet Take 1 tablet (10 mg total) by mouth daily.      FOLIC ACID/MULTIVITS-MIN/LUT (CENTRUM SILVER ORAL) Take 1 tablet by mouth daily.       colchicine 0.6 mg tablet Take 2 tabs by mouth and 1 tab 1 hr later.12 hrs later take 1 tab.Take 1 tab 1-3 times a day until 2 days after symptoms resolve. 12/26/2017: Received from: [facility]     naproxen (NAPROSYN) 250 MG tablet Initially, take 3 tabs (750 mg) x 1 dose by mouth for pain then 250 mg every 8 hours as needed until attack resolves. 12/26/2017: Received from: [facility]     Allergies:  No Known Allergies    Tobacco:   reports that he has never smoked. He has never used smokeless tobacco.     Physical Exam      /68  Pulse (!) 57  Wt (!) 231 lb (104.8 kg)  SpO2 97%  BMI 31.33 kg/m2      General Appearance:    Alert, cooperative, no distress   Eyes:    No conjunctival irritation, no scleral icterus       Lungs:     Clear to auscultation bilaterally, respirations unlabored   Heart:    Regular rate and rhythm, S1 and S2 normal, no murmur, rub   or gallop   Extremities:   Extremities normal, atraumatic, no cyanosis or edema   Skin:   Skin color, texture, turgor normal, no rashes or lesions   Neurologic:   Normal strength and sensation

## 2021-06-16 PROBLEM — M25.50 POLYARTHRALGIA: Status: ACTIVE | Noted: 2018-10-10

## 2021-06-16 PROBLEM — G47.33 OBSTRUCTIVE SLEEP APNEA: Status: ACTIVE | Noted: 2018-07-30

## 2021-06-16 PROBLEM — E78.5 HYPERLIPIDEMIA: Status: ACTIVE | Noted: 2017-06-30

## 2021-06-16 PROBLEM — M35.3 POLYMYALGIA (H): Status: ACTIVE | Noted: 2018-10-10

## 2021-06-19 NOTE — PROGRESS NOTES
Assessment and Plan:     Evaristo was seen today for annual wellness visit.    Screen for colon cancer  -     Ambulatory referral for Colonoscopy    Hyperlipidemia  -     Comprehensive Metabolic Panel  -     Lipid [place] FASTING    MANUEL (obstructive sleep apnea)    Impaired fasting glucose  -     Glycosylated Hemoglobin A1c    Acute gout, unspecified cause, unspecified site  -     Uric Acid    Screening for prostate cancer  -     PSA, Annual Screen (Prostatic-Specific Antigen)    Other orders  -     Pneumococcal polysaccharide vaccine 23-valent 3 yo or older, subq/IM  -     Td, Preservative Free (green label); Future      The patient's current medical problems were reviewed.    The following high BMI interventions were performed this visit: encouragement to exercise and weight monitoring  The following health maintenance schedule was reviewed with the patient and provided in printed form in the after visit summary:   Health Maintenance   Topic Date Due     ADVANCE DIRECTIVES DISCUSSED WITH PATIENT  10/31/2013     TD 18+ HE  10/31/2018     COLONOSCOPY  11/20/2018     INFLUENZA VACCINE RULE BASED (1) 08/01/2018     FALL RISK ASSESSMENT  07/10/2019     PNEUMOCOCCAL POLYSACCHARIDE VACCINE AGE 65 AND OVER  Completed     PNEUMOCOCCAL CONJUGATE VACCINE FOR ADULTS (PCV13 OR PREVNAR)  Completed     ZOSTER VACCINE  Completed      Subjective:   Chief Complaint: Evaristo Lindsey is an 68 y.o. male here for an Annual Wellness visit.   HPI: He is here today with his wife.  He has hyperlipidemia, impaired fasting glucose, gout, obstructive sleep apnea.  He reports some concerns with his mask used to treat sleep apnea but has follow-up scheduled with his sleep specialist.  His weight has gone up slightly.  He has had some concerns this past year with gout flareup.  Discussed laboratory evaluation to assess his blood sugar and his uric acid level.  Discussed reevaluation of his cholesterol.  Reviewed importance of a good diet and  regular exercise.  Reviewed other routine health preventive measures as documented    Review of Systems:Please see above.  The rest of the review of systems are negative for all systems.    Patient Care Team:  Guillermo Wharton MD as PCP - General     Patient Active Problem List   Diagnosis     Acrochordon     Impaired Fasting Glucose     MANUEL (obstructive sleep apnea)     Hyperlipidemia     Past Medical History:   Diagnosis Date     Cholelithiasis      Hyperlipidemia      Impaired fasting glucose      Kidney stone     30 yrs ago     Prehypertension      Sleep apnea     CPAP      Past Surgical History:   Procedure Laterality Date     LAPAROSCOPIC CHOLECYSTECTOMY N/A 2/12/2015    Procedure: CHOLECYSTECTOMY LAPAROSCOPIC;  Surgeon: Elayne Husain MD;  Location: [facility];  Service:      none      none      Family History   Problem Relation Age of Onset     Diabetes Mother      Diabetes Maternal Grandmother      Diabetes Maternal Grandfather       Social History     Social History     Marital status:      Spouse name: N/A     Number of children: N/A     Years of education: N/A     Occupational History     Not on file.     Social History Main Topics     Smoking status: Never Smoker     Smokeless tobacco: Never Used     Alcohol use Yes      Comment: rarely     Drug use: No     Sexual activity: Not on file     Other Topics Concern     Not on file     Social History Narrative      Current Outpatient Prescriptions   Medication Sig Dispense Refill     aspirin 81 MG EC tablet Take 81 mg by mouth daily.       atorvastatin (LIPITOR) 10 MG tablet Take 1 tablet (10 mg total) by mouth daily. 90 tablet 3     colchicine 0.6 mg tablet Take 2 tabs by mouth and 1 tab 1 hr later.12 hrs later take 1 tab.Take 1 tab 1-3 times a day until 2 days after symptoms resolve. 9 tablet 0     FOLIC ACID/MULTIVITS-MIN/LUT (CENTRUM SILVER ORAL) Take 1 tablet by mouth daily.        naproxen (NAPROSYN) 250 MG tablet Initially, take 3 tabs  (750 mg) x 1 dose by mouth for pain then 250 mg every 8 hours as needed until attack resolves. 12 tablet 0     No current facility-administered medications for this visit.       Objective:   Vital Signs:   Visit Vitals     /76     Pulse (!) 57     Ht 6' (1.829 m)     Wt (!) 247 lb (112 kg)     SpO2 98%     BMI 33.5 kg/m2      VisionScreening:  No exam data present     PHYSICAL EXAM    General Appearance:    Alert, cooperative, no distress, appears stated age   Eyes:   No scleral icterus or conjunctival irritation       Ears:    Normal TM's and external ear canals, both ears   Throat:   Lips, mucosa, and tongue normal; teeth and gums normal   Neck:   Supple, symmetrical, trachea midline, no adenopathy;        thyroid:  No enlargement/tenderness/nodules   Lungs:     Clear to auscultation bilaterally, respirations unlabored, no wheezes or crackles   Heart:    Regular rate and rhythm,  no murmur, rub   or gallop   Abdomen:     Soft, non-tender, bowel sounds active,     no masses, no organomegaly     Extremities:   Extremities normal, atraumatic, no cyanosis or edema   Skin:   Skin color, texture, turgor normal, no rashes or lesions   Neurologic:   Normal strength and sensation        throughout on gross examination.     Genitalia:    Normal male without lesion, discharge or tenderness   Rectal:    Normal tone, normal prostate, no masses or tenderness;         Assessment Results 7/10/2018   Activities of Daily Living No help needed   Instrumental Activities of Daily Living No help needed   Mini Cog Total Score 5   Some recent data might be hidden     A Mini-Cog score of 0-2 suggests the possibility of dementia, score of 3-5 suggests no dementia    Identified Health Risks:     The patient was provided with suggestions to help him develop a healthy lifestyle.   Patient's advanced directive was discussed and I am comfortable with the patient's wishes.

## 2021-06-20 NOTE — PROGRESS NOTES
Assessment and Plan:     1. Fatigue, unspecified type  Electrocardiogram Perform - Clinic    XR Chest 2 Views    HM1(CBC and Differential)    Parvovirus B19 Antibodies, IgG and IgM    Comprehensive Metabolic Panel    Urinalysis-UC if Indicated    Chad-Barr Virus (EBV) Antibodies, IgG    Thyroid Cascade    HM1 (CBC with Diff)   2. Night sweats  XR Chest 2 Views    HM1(CBC and Differential)    Parvovirus B19 Antibodies, IgG and IgM    Comprehensive Metabolic Panel    Urinalysis-UC if Indicated    Chad-Barr Virus (EBV) Antibodies, IgG    Thyroid Cascade    HM1 (CBC with Diff)   3. Polymyalgia (H)  Ambulatory referral to Rheumatology   4. Polyarthralgia  Ambulatory referral to Rheumatology   5. MANUEL (obstructive sleep apnea)       Discussed previous lab findings including elevation of inflammatory markers.  Patient may have an autoimmune disease with flares that occur yearly.  Will  recheck hemogram to determine stability and assess for improvement of anemia.  Will rule out parvovirus, mono, thyroid disease.  Due to fatigue, I did evaluate chest x-ray for underlying mass and infection.  Chest x-ray shows no acute findings.  EKG shows sinus bradycardia.  Tickborne disease evaluation in the past has been normal.  We did opt to treat with another dose of prednisone as this improved his symptoms previously.  He will see rheumatology as recommended.  He is content with the plan.    I spent 40 minutes with the patient with greater than 50% spent discussing symptoms, treatment options, and coordination of care.       Subjective:     Evaristo is a 68 y.o. male presenting to the clinic for concerns for myalgias and arthralgias.  Patient states at least once per year, he develops symptoms including myalgias and arthralgias that lasts for 4-6 weeks.  This is been ongoing since at least 2009.  Patient returned from a two-week cruise to [place] and had developed left foot pain that persisted.  Patient felt feverish  and was evaluated in clinic on 9/4/18.  He had unremarkable SANDRITA, ehrlichia, lymes, rheumatoid factor, CK. Hemogram showed a hemoglobin of 13.3 and hematocrit of 38.7.  Uric acid was 5.7. C-reactive protein was 5.3. Sed rate was 43.  He was referred to rheumatology.  He was treated for gout with prednisone 20 mg twice daily for 5 days.  He was told to increase his allopurinol to 200 mg daily and start colchicine 0.6 mg daily.  Patient states it took 3 days for his foot pain to improve.  He continues to feel fatigued with generalized myalgias and arthralgias.  He has not noticed any rashes.  He denies any hair loss.  He has not had any redness or swelling of his joints.  He has had mild sinus congestion with postnasal drainage.  He denies chest pain, chest tightness, shortness of breath with exertion, edema, orthopnea, syncope.  He continues to experience some night sweats.  He does have sleep apnea and uses a CPAP machine.  He continues to wake up 4 times per night.  He has a rheumatology appointment on October 10.    Review of Systems: A complete 14 point review of systems was obtained and is negative or as stated in the history of present illness.    Social History     Social History     Marital status:      Spouse name: N/A     Number of children: N/A     Years of education: N/A     Occupational History     Not on file.     Social History Main Topics     Smoking status: Never Smoker     Smokeless tobacco: Never Used     Alcohol use Yes      Comment: rarely     Drug use: No     Sexual activity: Not on file     Other Topics Concern     Not on file     Social History Narrative       Active Ambulatory Problems     Diagnosis Date Noted     Acrochordon      Impaired Fasting Glucose      MANUEL (obstructive sleep apnea) 02/10/2015     Hyperlipidemia 06/30/2017     Obstructive sleep apnea 07/30/2018     Resolved Ambulatory Problems     Diagnosis Date Noted     Anemia      Hyperlipidemia      Serum Enzyme Levels - ALT  (SGPT) Elevated      Prehypertension      Liver lesion 12/14/2014     Obesity 01/05/2015     Preoperative examination, unspecified 02/10/2015     Cholelithiasis 02/10/2015     Past Medical History:   Diagnosis Date     Cholelithiasis      Hyperlipidemia      Impaired fasting glucose      Kidney stone      Prehypertension      Sleep apnea        Family History   Problem Relation Age of Onset     Diabetes Mother      Diabetes Maternal Grandmother      Diabetes Maternal Grandfather        Objective:     /76  Pulse 64  Temp 97.6  F (36.4  C)  Ht 6' (1.829 m)  Wt (!) 245 lb 9.6 oz (111.4 kg)  BMI 33.31 kg/m2    Patient is alert, in no obvious distress.   Skin: Warm, dry.  No lesions or rashes.  Skin turgor rapid return.   HEENT:  Head normocephalic, atraumatic.  Eyes normal.  PERRL.  EOM's intact.  No nystagmus. Ears normal.  Nose patent, mucosa pink.  Oropharynx mucosa pink.  No lesions or tonsillar enlargement.   Neck: Supple, no lymphadenopathy. No thyromegaly.  Lungs:  Clear to auscultation. Respirations even and unlabored.  No wheezing or rales noted.   Heart:  Regular rate and rhythm.  No murmurs, S3, S4, gallops, or rubs.    Abdomen: Soft, nontender.  No organomegaly. Bowel sounds normoactive. No guarding or masses noted.   Musculoskeletal:  Full ROM of extremities.  DTRs symmetrical, sensations intact.  No obvious deformity.  Muscle strength equal +5/5.   Neurological:  Cranial nerves 2-12 intact.      LABORATORY: I ordered and personally reviewed an EKG showing sinus bradycardia at 55 bpm.    I ordered and personally reviewed a chest x-ray showing no obvious infiltrate.  Will have radiology review.

## 2021-06-20 NOTE — PROGRESS NOTES
ASSESSMENT AND PLAN:  Evaristo Lindsey 68 y.o. male is seen here on 10/10/18 for evaluation of myalgias, arthralgias, headaches, and other systemic symptoms, with acute inflammatory response including elevated sed rate CRP, and a very brisk response to near complete resolution of symptoms with a modest dose of prednisone, recurrence of these symptoms when he is off prednisone.  Currently he is asymptomatic, virtually.  He has had more than one episodes across the years he may have had multiple such episodes in the past few years.  Today he does not have evidence of inflammatory joint disease, connective tissue disease.  For polymyalgia rheumatica to present in this periodic fashion would be highly unusual.  The key questions would be if what he has experienced this time is what he has experienced in the past.  1 of the conditions to keep in mind would be periodic fevers.  He is going to monitor his temperature daily, look for a rash, will observe for any signs of inflammation of the eyes should this happen again in the future.  He will return for follow-up next time when this happens.  Further workup as noted.  Diagnoses and all orders for this visit:    Polymyalgia (H)  -     HM1(CBC and Differential)  -     Creatinine  -     ALT (SGPT)  -     Albumin  -     CCP Antibodies  -     Hepatitis C Antibody (Anti-HCV)  -     Erythrocyte Sedimentation Rate  -     C-Reactive Protein  -     Anti-Neutrophil Cytoplasmic Antibody, IgG (ANCA IFA)  -     Hepatitis B Surface Antigen (HBsAG)  -     HM1 (CBC with Diff)    Polyarthralgia  -     HM1(CBC and Differential)  -     Creatinine  -     ALT (SGPT)  -     Albumin  -     CCP Antibodies  -     Hepatitis C Antibody (Anti-HCV)  -     Erythrocyte Sedimentation Rate  -     C-Reactive Protein  -     Anti-Neutrophil Cytoplasmic Antibody, IgG (ANCA IFA)  -     Hepatitis B Surface Antigen (HBsAG)  -     HM1 (CBC with Diff)      HISTORY OF PRESENTING ILLNESS:  Evaristo Lindsey, 68 y.o.,  "male is here for evaluation of pain.  He reports that it was in the end of August around August 27 that he started feeling unwell.  This includes pain and stiffness across his shoulders, upper and lower extremities torso.  It feels to him as if he has been \"hit by a truck\".  Typically begins with the headache.  This comes on fairly quickly.  He was seen at his primary physician's office.  His sed rate and CRP were elevated, sed rate was 43.  He was given a course of prednisone.  Within 24 hours he felt back to normal self.  As he ran out of the prednisone his symptoms began to repeat reappeared.  By about a week or so after he finished prednisone he was back to where he started.  He was given another course of prednisone this time on 9/14/2018 for the next 4-5 days and once again he responded as briskly as he had done in the past.  Today he feels rather well and himself.  He did not observe swelling in any of the joints of the hands or wrists the elbows knees ankles during this time.  Interestingly he noted that the cycle of symptoms seems to have happened periodically.  This he feels is gone on over the past several years may be even 8-9 years.  He thought this typically happens in the fall season.  His wife thought that she recalls him having symptoms like this in other types of other times of the year such as in the spring or even summer.  A question of whether this may be Lyme disease has been entertained in the past.  The headaches also disappeared there is no history of double vision there is been no history suggestive of jaw claudication.  Now he noted pain level to be 0.  There is no stiffness in the mornings he can do whatever he wants to do.  During these episodes he feels feverish at times he has been noticed to have night sweats.  He would check his temperature which would be normal however.  He is not a smoker does not take alcohol.  He had cholecystectomy in 2013 approximately.  He has sleep apnea.  " There is no personal family history of psoriasis ulcerative colitis Crohn's disease.  There is no family history of lupus or rheumatoid arthritis.  He has family history of gout in his father and brother he and he himself has been diagnosed to have gout recently started on allopurinol and colchicine and finding them helpful.    Further historical information and ADL limitations as noted in the multidimensional health assessment questionnaire attached in the EMR. Rest of the 13 system ROS is negative.     ALLERGIES:Review of patient's allergies indicates no known allergies.    PAST MEDICAL/ACTIVE PROBLEMS/MEDICATION/ FAMILY HISTORY/SOCIAL DATA:  The patient has a family history of  Past Medical History:   Diagnosis Date     Cholelithiasis      Hyperlipidemia      Impaired fasting glucose      Kidney stone     30 yrs ago     Prehypertension      Sleep apnea     CPAP     History   Smoking Status     Never Smoker   Smokeless Tobacco     Never Used     Patient Active Problem List   Diagnosis     Acrochordon     Impaired Fasting Glucose     MANUEL (obstructive sleep apnea)     Hyperlipidemia     Obstructive sleep apnea     Polymyalgia (H)     Polyarthralgia     Current Outpatient Prescriptions   Medication Sig Dispense Refill     allopurinol (ZYLOPRIM) 100 MG tablet Take 2 tablets (200 mg total) by mouth daily. 180 tablet 1     allopurinol (ZYLOPRIM) 100 MG tablet Take 200 mg by mouth.       aspirin 81 MG EC tablet Take 81 mg by mouth daily.       atorvastatin (LIPITOR) 10 MG tablet Take 1 tablet (10 mg total) by mouth daily. 90 tablet 3     colchicine 0.6 mg tablet Take 1 tablet (0.6 mg total) by mouth daily. 30 tablet 5     FOLIC ACID/MULTIVITS-MIN/LUT (CENTRUM SILVER ORAL) Take 1 tablet by mouth daily.        naproxen (NAPROSYN) 250 MG tablet Initially, take 3 tabs (750 mg) x 1 dose by mouth for pain then 250 mg every 8 hours as needed until attack resolves. 12 tablet 0     No current facility-administered medications for  this visit.        COMPREHENSIVE EXAMINATION:  Vitals:    10/10/18 0956   BP: 124/84   Patient Site: Right Arm   Patient Position: Sitting   Cuff Size: Adult Regular   Pulse: 60   Weight: (!) 247 lb 6.4 oz (112.2 kg)   Height: 6' (1.829 m)     A well appearing alert oriented male. Vital data as noted above. His eyes without inflammation/scleromalacia. ENTwithout oral mucositis, thrush, nasal deformity, external ear redness, deformity. His neck is without lymphadenopathy and supple. Lungs normal sounds, no pleural rub. Heart auscultation normal rate, rhythm; no pericardial rub and murmurs. Abdomen soft, non tender, no organomegaly. Skin examined for heliotrope, malar area eruption, lupus pernio, periungual erythema, sclerodactyly, papules, erythema nodosum, purpura, nail pitting, onycholysis, and obvious psoriasis lesion. Neurological examination shows normal alertness, speech, facial symmetry, tone and power in upper and lower extremities, Tinel's and Phalen's at wrist and gait. The joint examination is performed for swelling, tenderness, warmth, erythema, and range of motion in the following joints: DIPs, PIPs, MCPs, wrists, first CMC's, elbows, shoulders, hips, knees, ankles, feet; spine for range of motion and paraspinal muscles for tenderness. The salient normal / abnormal findings are appended.  He does not have synovitis in any of the palpable joints of upper or lower extremities.  He has full range of motion of the shoulders.  There is no dactylitis.  He has beginning of Heberden's.  Mild squaring of the first CMC's.    LAB / IMAGING DATA:  ALT   Date Value Ref Range Status   09/13/2018 19 0 - 45 U/L Final   07/10/2018 20 0 - 45 U/L Final   12/26/2017 20 0 - 45 U/L Final     Albumin   Date Value Ref Range Status   09/13/2018 3.9 3.5 - 5.0 g/dL Final   07/10/2018 4.3 3.5 - 5.0 g/dL Final   12/26/2017 3.6 3.5 - 5.0 g/dL Final     Creatinine   Date Value Ref Range Status   09/13/2018 1.19 0.70 - 1.30 mg/dL Final    07/10/2018 1.11 0.70 - 1.30 mg/dL Final   12/26/2017 1.18 0.70 - 1.30 mg/dL Final       WBC   Date Value Ref Range Status   10/02/2018 5.1 4.0 - 11.0 thou/uL Final   09/13/2018 7.0 4.0 - 11.0 thou/uL Final   09/21/2015 5.7 4.0 - 11.0 thou/uL Final     Hemoglobin   Date Value Ref Range Status   10/02/2018 14.1 14.0 - 18.0 g/dL Final   09/13/2018 15.2 14.0 - 18.0 g/dL Final   09/04/2018 13.3 (L) 14.0 - 18.0 g/dL Final     Platelets   Date Value Ref Range Status   10/02/2018 221 140 - 440 thou/uL Final   09/13/2018 255 140 - 440 thou/uL Final   09/04/2018 236 140 - 440 thou/uL Final       Lab Results   Component Value Date    RF <15.0 09/04/2018    SEDRATE 28 (H) 10/02/2018

## 2021-06-20 NOTE — PROGRESS NOTES
Assessment/Plan:    1. Myalgia  Multiple myalgias, nonspecific.  Is on atorvastatin over past year and will check CK level today.  Also completed sedimentation rate, CRP, CBC, Lyme titer and Ehrlichia panel, rheumatoid factor, SANDRITA cascade.  [place] a [place] of [facility].  Referral placed to see rheumatology.  - Sedimentation Rate  - C-Reactive Protein(CRP)  - Uric Acid  - HM2(CBC w/o Differential)  - Lyme Antibody Cascade  - Antinuclear Antibody (SANDRITA) Cascade  - Ehrlichia and Anaplasma Species by Real-Time PCR  - Rheumatoid Factor Quant  - Ambulatory referral to Rheumatology  - CK Total    2. Arthralgia  Arthralgias nonspecific.  Lab panel completed as noted below.  Referral was placed to see rheumatology.  - Sedimentation Rate  - C-Reactive Protein(CRP)  - Uric Acid  - HM2(CBC w/o Differential)  - Lyme Antibody Cascade  - Antinuclear Antibody (SANDRITA) Cascade  - Ehrlichia and Anaplasma Species by Real-Time PCR  - Rheumatoid Factor Quant  - Ambulatory referral to Rheumatology    3. Gout  Discussed history of gout with prior uric acid level 8.6 September 21, 2015.  Had recently been started on allopurinol 100 mg daily.  Gout flare perhaps 2 weeks later after returning from a trip to [place] in [place] by cruise.  Brother uses Uloric once daily.  Recommended prednisone 20 mg twice daily ×5 days then increasing allopurinol to 200 mg once daily along with use of colchicine 0.6 mg daily to prevent recurrent gout flares.  Ensure uric acid less than 6 ideally.  Foot x-ray without significant first MTP joint osteoarthritis.  Mild sclerosis.  No erosions of the joint.  No bone spur formation etc.  - Uric Acid  - predniSONE (DELTASONE) 20 MG tablet; Take 1 tablet (20 mg total) by mouth daily for 5 days.  Dispense: 10 tablet; Refill: 1  - colchicine 0.6 mg tablet; Take 1 tablet (0.6 mg total) by mouth daily.  Dispense: 30 tablet; Refill: 5  - allopurinol (ZYLOPRIM) 100 MG tablet; Take 2 tablets (200 mg total) by mouth  daily.  Dispense: 180 tablet; Refill: 1    4. Left foot pain  As above, left foot x-ray obtained with minimal sclerosis likely involving first MTP joint.  Will have radiologist review.  - XR Foot Left 3 or More VWS; Future    5. Incisional hernia, epigastric  Small, reducible.    40 minutes total time with patient, > 50% with counseling and coordination of cares.           Subjective:    Evaristo Lindsey is seen today for several concerns.  Achiness all over.  Muscle and joint involvement.  Recently returned from a two-week cruise to [place] in [place] otherwise has had left foot pain that persists.  Fevers felt however not with thermometer.  Some chills.  Decreased appetite.  This tends to happen the fall.  Has had Lyme titer previously which is negative.  [facility].  His obstructive sleep apnea history.  Impaired fasting glucose.  Brother with gout and uses Uloric daily prophylactically.  No rash.  Recently started on allopurinol 100 mg daily.  Has colchicine for breakthrough symptoms are Naprosyn.  Feels poorly.  Will travel to [facility] for winter vacation in October.  No specific injury or trauma to left foot.  No other specific areas of joint swelling identified.  Had gallbladder removed 3 years ago.  Also notices a bulge in epigastric region of abdomen.  No bloating.  Comprehensive review of systems as above otherwise all negative.  Denies smoking.      Past Surgical History:   Procedure Laterality Date     LAPAROSCOPIC CHOLECYSTECTOMY N/A 2/12/2015    Procedure: CHOLECYSTECTOMY LAPAROSCOPIC;  Surgeon: Elayne Husain MD;  Location: [facility];  Service:      none      none        Family History   Problem Relation Age of Onset     Diabetes Mother      Diabetes Maternal Grandmother      Diabetes Maternal Grandfather         Past Medical History:   Diagnosis Date     Cholelithiasis      Hyperlipidemia      Impaired fasting glucose      Kidney stone     30 yrs ago      Prehypertension      Sleep apnea     CPAP        Social History   Substance Use Topics     Smoking status: Never Smoker     Smokeless tobacco: Never Used     Alcohol use Yes      Comment: rarely        Current Outpatient Prescriptions   Medication Sig Dispense Refill     allopurinol (ZYLOPRIM) 100 MG tablet Take 2 tablets (200 mg total) by mouth daily. 180 tablet 1     aspirin 81 MG EC tablet Take 81 mg by mouth daily.       atorvastatin (LIPITOR) 10 MG tablet Take 1 tablet (10 mg total) by mouth daily. 90 tablet 3     FOLIC ACID/MULTIVITS-MIN/LUT (CENTRUM SILVER ORAL) Take 1 tablet by mouth daily.        colchicine 0.6 mg tablet Take 1 tablet (0.6 mg total) by mouth daily. 30 tablet 5     naproxen (NAPROSYN) 250 MG tablet Initially, take 3 tabs (750 mg) x 1 dose by mouth for pain then 250 mg every 8 hours as needed until attack resolves. 12 tablet 0     predniSONE (DELTASONE) 20 MG tablet Take 1 tablet (20 mg total) by mouth daily for 5 days. 10 tablet 1     No current facility-administered medications for this visit.           Objective:    Vitals:    09/04/18 1259   BP: 120/60   Pulse: 67   SpO2: 95%   Weight: (!) 255 lb (115.7 kg)      Body mass index is 34.58 kg/(m^2).    Alert.  Appears rundown and frustrated.  HEENT exam normal.  Neck supple.  Chest clear.  Cardiac exam regular.  Abdomen with incisional hernia epigastric region from prior laparoscopic cholecystectomy procedure.  Positive bowel sounds.  No bloating.  Extremities warm and dry.  Left great toe first MTP joint redness and warmth noted, mild.  Mild tenderness locally.  No ankle swelling or foot deformity otherwise.  No right foot involvement 2.  Neurologic exam appears nonfocal.      This note has been dictated using voice recognition software and as a result may contain minor grammatical errors and unintended word substitutions.

## 2021-06-20 NOTE — PROGRESS NOTES
Patient ID: Evaristo Lindsey is a 68 y.o. male.  /76  Pulse (!) 55  Wt (!) 245 lb (111.1 kg)  SpO2 98%  BMI 33.23 kg/m2    Assessment/Plan:                Diagnoses and all orders for this visit:    Malaise  -     HM1(CBC and Differential)  -     C-Reactive Protein(CRP)  -     Sedimentation Rate  -     HM1 (CBC with Diff)    Elevated C-reactive protein (CRP)  -     C-Reactive Protein(CRP)  -     Sedimentation Rate    Gout    Other orders  -     Cancel: Td, Preservative Free (green label)      DISCUSSION  Unclear cause for his symptoms.  Symptoms are noted to respond favorably to prednisone at approximately 20 mg/day over a 5-day course.  Symptoms have regressed on 2 occasions after completion of the steroid course.  In early September had definitive symptoms consistent with gout.  Has not had a recurrence of these specific symptoms.    Possible etiologies for his symptoms include autoimmune disorder, infection, a process such as gout, some of his symptoms could be attributed to inadequately treated sleep apnea although this seems less likely given his recent evaluation by sleep specialist, seasonal depression symptoms could also be a factor given the recurrent nature on a few occasions of these types of symptoms.  This could also be medication reaction.    At this time we will have him hold both his atorvastatin and allopurinol and monitor the symptoms.  We will trend the CRP and sedimentation rate which have not been checked for approximately 1 month at this point.  He will continue to take his baby aspirin.  He will follow-up as scheduled with the rheumatologist.  Subjective:     HPI    Evaristo Lindsey is a 68 y.o. male I saw him in July when he was doing well.  He has a history of gout.  Because of his history of gout we elected to put him on suppression therapy with allopurinol.  He started the medication had no issues.  Just prior to September 4 after returning from a [place] ocean cruise in [place]  and [place] he developed left ankle pain, myalgias, arthralgias and general malaise.  He was seen for a clinic appointment on September 4 where a multitude of laboratory tests were obtained.  He was diagnosed with gout and placed on a 5-day course of prednisone.  Prednisone rapidly improved all symptoms.  He was found to have elevated CRP at 5.3 and a mildly elevated sedimentation rate at 43.  Additional tests for infection including Lyme and Ehrlichia were obtained and did not show any infection process.  He had a negative rheumatoid factor as well.  His uric acid level was already noted to be suppressed to 5.7 after just a few months on the allopurinol at 100 mg.    Patient states that after the 5-day course of prednisone he was back to his normal self and doing all of his normal activities and had plenty of energy.  Within a few more days patient began to experience worsening symptoms.  He did not have any focal joint pain but had myalgias arthralgias general malaise and fatigue.  He was seen on September 13 at which time additional lab tests were obtained including EBV and parvovirus serology.  Both of these showed likely past infection.  Laboratory tests including hemogram and conference of metabolic panel were completely normal.  A CK level was obtained which was normal.  He was given another 5-day course of treatment of prednisone.  He does report again that things improved but within days of completing the medication symptoms have again regressed.  Again he does not report any focal joint pain or really any arthralgias at this point but just does not feel well.  Occasionally feels as though his balance is off but does not describe distinct ongoing vertigo symptoms.  He continues to take atorvastatin and allopurinol.  He reports his appetite is normal.  He denies nausea vomiting or diarrhea.  Denies any urinary symptoms or change in the color of his urine.  He does have underlying sleep apnea.  He was  evaluated by his sleep specialist who did not feel sleep apnea was contributing in any way to his current symptoms.  He continues to take atorvastatin 10 mg daily and is now on allopurinol 200 mg daily.  For a time was placed on colchicine 1 allopurinol dose was first started.  He is no longer taking colchicine.  He does have a follow-up appointment scheduled with her rheumatologist for October 10.    Review of Systems  Complete review of systems is obtained.  Other than the specific considerations noted above complete review of systems is negative.        Objective:   Medications:  Current Outpatient Prescriptions   Medication Sig Note     allopurinol (ZYLOPRIM) 100 MG tablet Take 2 tablets (200 mg total) by mouth daily.      allopurinol (ZYLOPRIM) 100 MG tablet Take 200 mg by mouth. 10/2/2018: Received from: [facility] Received Sig: Take 200 mg by mouth daily.     aspirin 81 MG EC tablet Take 81 mg by mouth daily.      atorvastatin (LIPITOR) 10 MG tablet Take 1 tablet (10 mg total) by mouth daily.      colchicine 0.6 mg tablet Take 1 tablet (0.6 mg total) by mouth daily.      FOLIC ACID/MULTIVITS-MIN/LUT (CENTRUM SILVER ORAL) Take 1 tablet by mouth daily.       naproxen (NAPROSYN) 250 MG tablet Initially, take 3 tabs (750 mg) x 1 dose by mouth for pain then 250 mg every 8 hours as needed until attack resolves.      Allergies:  No Known Allergies    Tobacco:   reports that he has never smoked. He has never used smokeless tobacco.     Physical Exam      /76  Pulse (!) 55  Wt (!) 245 lb (111.1 kg)  SpO2 98%  BMI 33.23 kg/m2        General Appearance:    Alert, cooperative, no distress, appears stated age   Eyes:   No scleral icterus or conjunctival irritation       Ears:    Normal TM's and external ear canals, both ears   Throat:   Lips, mucosa, and tongue normal; teeth and gums normal   Neck:   Supple, symmetrical, trachea midline, no adenopathy;        thyroid:  No enlargement/tenderness/nodules    Lungs:     Clear to auscultation bilaterally, respirations unlabored, no wheezes or crackles   Heart:    Regular rate and rhythm,  no murmur, rub   or gallop   Abdomen:     Soft, non-tender, bowel sounds active,     no masses, no organomegaly     Extremities:  No obvious joint abnormalities, no red or swollen joints, generally good range of motion, walks with a normal gait.  Does not reportJoint stiffness or muscle pain.  No edema is noted   Skin:   Skin color, texture, turgor normal, no rashes or lesions   Neurologic:   Normal strength and sensation        throughout on gross examination.

## 2021-06-27 ENCOUNTER — HEALTH MAINTENANCE LETTER (OUTPATIENT)
Age: 71
End: 2021-06-27

## 2021-07-02 ENCOUNTER — OFFICE VISIT - HEALTHEAST (OUTPATIENT)
Dept: FAMILY MEDICINE | Facility: CLINIC | Age: 71
End: 2021-07-02

## 2021-07-02 DIAGNOSIS — Z12.5 SCREENING FOR PROSTATE CANCER: ICD-10-CM

## 2021-07-02 DIAGNOSIS — E11.9 TYPE 2 DIABETES MELLITUS WITHOUT COMPLICATION, WITHOUT LONG-TERM CURRENT USE OF INSULIN (H): ICD-10-CM

## 2021-07-02 DIAGNOSIS — Z00.01 ENCOUNTER FOR GENERAL ADULT MEDICAL EXAMINATION WITH ABNORMAL FINDINGS: ICD-10-CM

## 2021-07-02 DIAGNOSIS — E78.5 HYPERLIPIDEMIA, UNSPECIFIED HYPERLIPIDEMIA TYPE: ICD-10-CM

## 2021-07-02 DIAGNOSIS — R68.82 DECREASED LIBIDO: ICD-10-CM

## 2021-07-02 DIAGNOSIS — E78.5 HYPERLIPIDEMIA: ICD-10-CM

## 2021-07-02 DIAGNOSIS — M10.9 GOUT: ICD-10-CM

## 2021-07-02 LAB
ALBUMIN SERPL-MCNC: 4.4 G/DL (ref 3.5–5)
ALP SERPL-CCNC: 88 U/L (ref 45–120)
ALT SERPL W P-5'-P-CCNC: 18 U/L (ref 0–45)
ANION GAP SERPL CALCULATED.3IONS-SCNC: 11 MMOL/L (ref 5–18)
AST SERPL W P-5'-P-CCNC: 19 U/L (ref 0–40)
BILIRUB SERPL-MCNC: 0.8 MG/DL (ref 0–1)
BUN SERPL-MCNC: 20 MG/DL (ref 8–22)
CALCIUM SERPL-MCNC: 9.5 MG/DL (ref 8.5–10.5)
CHLORIDE BLD-SCNC: 104 MMOL/L (ref 98–107)
CHOLEST SERPL-MCNC: 192 MG/DL
CO2 SERPL-SCNC: 26 MMOL/L (ref 22–31)
CREAT SERPL-MCNC: 1.12 MG/DL (ref 0.7–1.3)
FASTING STATUS PATIENT QL REPORTED: YES
GFR SERPL CREATININE-BSD FRML MDRD: >60 ML/MIN/1.73M2
GLUCOSE BLD-MCNC: 117 MG/DL (ref 70–125)
HBA1C MFR BLD: 6.2 %
HDLC SERPL-MCNC: 40 MG/DL
LDLC SERPL CALC-MCNC: 121 MG/DL
POTASSIUM BLD-SCNC: 4.2 MMOL/L (ref 3.5–5)
PROT SERPL-MCNC: 6.4 G/DL (ref 6–8)
PSA SERPL-MCNC: 1.9 NG/ML (ref 0–4.5)
SODIUM SERPL-SCNC: 141 MMOL/L (ref 136–145)
TRIGL SERPL-MCNC: 155 MG/DL
URATE SERPL-MCNC: 5.5 MG/DL (ref 3–8)

## 2021-07-02 RX ORDER — ALLOPURINOL 100 MG/1
100 TABLET ORAL DAILY
Qty: 180 TABLET | Refills: 3 | Status: SHIPPED
Start: 2021-07-02 | End: 2022-07-01

## 2021-07-02 ASSESSMENT — MIFFLIN-ST. JEOR: SCORE: 1859.55

## 2021-07-02 ASSESSMENT — ANXIETY QUESTIONNAIRES
2. NOT BEING ABLE TO STOP OR CONTROL WORRYING: NOT AT ALL
1. FEELING NERVOUS, ANXIOUS, OR ON EDGE: NOT AT ALL

## 2021-07-03 NOTE — ADDENDUM NOTE
Addendum Note by Nery Pang CMA at 7/15/2019  7:40 AM     Author: Nery Pang CMA Service: -- Author Type: Certified Medical Assistant    Filed: 7/16/2019 11:28 AM Encounter Date: 7/15/2019 Status: Signed    : Nery Pang CMA (Certified Medical Assistant)    Addended by: NERY PANG on: 7/16/2019 11:28 AM        Modules accepted: Orders

## 2021-07-03 NOTE — ADDENDUM NOTE
Addendum Note by Natalie Clements MD at 7/15/2019  7:40 AM     Author: Natalie Clements MD Service: -- Author Type: Physician    Filed: 7/16/2019  5:21 PM Encounter Date: 7/15/2019 Status: Signed    : Natalie Clements MD (Physician)    Addended by: NATALIE CLEMENTS on: 7/16/2019 05:21 PM        Modules accepted: Orders

## 2021-07-04 NOTE — PATIENT INSTRUCTIONS - HE
Patient Instructions by Guillermo Wharton MD at 7/2/2021  9:00 AM     Author: Guillermo Wharton MD Service: -- Author Type: Physician    Filed: 7/2/2021  9:42 AM Encounter Date: 7/2/2021 Status: Signed    : Guillermo Wharton MD (Physician)           Advance Directive  Patients advance directive was discussed and I am comfortable with the patients wishes.  Patient Education   Personalized Prevention Plan  You are due for the preventive services outlined below.  Your care team is available to assist you in scheduling these services.  If you have already completed any of these items, please share that information with your care team to update in your medical record.  There are no preventive care reminders to display for this patient.

## 2021-07-04 NOTE — PROGRESS NOTES
Progress Notes by Guillermo Wharton MD at 7/2/2021  9:00 AM     Author: Guillermo Wharton MD Service: -- Author Type: Physician    Filed: 7/2/2021 10:17 AM Encounter Date: 7/2/2021 Status: Signed    : Guillermo Wharton MD (Physician)         Assessment and Plan:     Patient has been advised of split billing requirements and indicates understanding: Yes  Evairsto was seen today for annual wellness visit, fatigue, medication questions and nail problem.    Encounter for general adult medical examination with abnormal findings    Gout  -     Uric Acid  -     allopurinoL (ZYLOPRIM) 100 MG tablet; Take 1 tablet (100 mg total) by mouth daily.    Hyperlipidemia, unspecified hyperlipidemia type  -     Lipid Cascade    Screening for prostate cancer  -     PSA, Annual Screen (Prostatic-Specific Antigen)    Hyperlipidemia  -     Comprehensive Metabolic Panel    Decreased libido  -     Testosterone, Total    Type 2 diabetes mellitus without complication, without long-term current use of insulin (H)  -     Glycosylated Hemoglobin A1c      The patient's current medical problems were reviewed.    The following high BMI interventions were performed this visit: encouragement to exercise  The following health maintenance schedule was reviewed with the patient and provided in printed form in the after visit summary:   Health Maintenance   Topic Date Due   ? INFLUENZA VACCINE RULE BASED (1) 08/01/2021   ? MEDICARE ANNUAL WELLNESS VISIT  07/02/2022   ? FALL RISK ASSESSMENT  07/02/2022   ? LIPID  08/31/2025   ? ADVANCE CARE PLANNING  07/02/2026   ? TD 18+ HE  10/15/2028   ? HEPATITIS C SCREENING  Completed   ? Pneumococcal Vaccine: 65+ Years  Completed   ? ZOSTER VACCINES  Completed   ? COVID-19 Vaccine  Completed   ? Pneumococcal Vaccine: Pediatrics (0 to 5 Years) and At-Risk Patients (6 to 64 Years)  Aged Out   ? HEPATITIS B VACCINES  Aged Out   ? COLORECTAL CANCER SCREENING  Discontinued     Subjective:   Chief Complaint: Evaristo  LETY Lindsey is an 71 y.o. male here for an Annual Wellness visit.   HPI: He has diet-controlled type 2 diabetes current A1c 6.2.  No other complications.  Overall doing well.  Prior history of gout.  No recent attacks.  Discussed reduction of dose of allopurinol.  Continues to exercise and work on healthy balanced eating.  Weight has gone down slowly, slower than expected but he seems to have a good overall approach to his health and nutrition.  He has obstructive sleep apnea follows with the sleep specialist reports no concern.  Reports decreased libido would like to have testosterone checked which is reasonable.  Reports some problems with toenails that are recurrent but getting better currently.  States he tends to lose his big toenails when he does a lot of activity this happened this past year.    Overall doing well.  Reviewed other routine health preventive measures.    Review of Systems: Please see above.  The rest of the review of systems are negative for all systems.    Patient Care Team:  Guillermo Wharton MD as PCP - General  Guillermo Wharton MD as Assigned PCP     Patient Active Problem List   Diagnosis   ? Acrochordon   ? Impaired Fasting Glucose   ? MANUEL (obstructive sleep apnea)   ? Hyperlipidemia   ? Obstructive sleep apnea   ? Polymyalgia (H)   ? Polyarthralgia     Past Medical History:   Diagnosis Date   ? Cholelithiasis    ? Hyperlipidemia    ? Impaired fasting glucose    ? Kidney stone     30 yrs ago   ? Prehypertension    ? Sleep apnea     CPAP      Past Surgical History:   Procedure Laterality Date   ? LAPAROSCOPIC CHOLECYSTECTOMY N/A 2/12/2015    Procedure: CHOLECYSTECTOMY LAPAROSCOPIC;  Surgeon: Elayne Husain MD;  Location: [facility];  Service:    ? none      none      Family History   Problem Relation Age of Onset   ? Diabetes Mother    ? Diabetes Maternal Grandmother    ? Diabetes Maternal Grandfather       Social History     Socioeconomic History   ? Marital status:       Spouse name: Not on file   ? Number of children: Not on file   ? Years of education: Not on file   ? Highest education level: Not on file   Occupational History   ? Not on file   Social Needs   ? Financial resource strain: Not on file   ? Food insecurity     Worry: Not on file     Inability: Not on file   ? Transportation needs     Medical: Not on file     Non-medical: Not on file   Tobacco Use   ? Smoking status: Never Smoker   ? Smokeless tobacco: Never Used   Substance and Sexual Activity   ? Alcohol use: Yes     Comment: rarely   ? Drug use: No   ? Sexual activity: Not on file   Lifestyle   ? Physical activity     Days per week: Not on file     Minutes per session: Not on file   ? Stress: Not on file   Relationships   ? Social connections     Talks on phone: Not on file     Gets together: Not on file     Attends Sikhism service: Not on file     Active member of club or organization: Not on file     Attends meetings of clubs or organizations: Not on file     Relationship status: Not on file   ? Intimate partner violence     Fear of current or ex partner: Not on file     Emotionally abused: Not on file     Physically abused: Not on file     Forced sexual activity: Not on file   Other Topics Concern   ? Not on file   Social History Narrative   ? Not on file      Current Outpatient Medications   Medication Sig Dispense Refill   ? allopurinoL (ZYLOPRIM) 100 MG tablet Take 1 tablet (100 mg total) by mouth daily. 180 tablet 3   ? aspirin 81 MG EC tablet Take 81 mg by mouth daily.     ? atorvastatin (LIPITOR) 10 MG tablet TAKE 1 TABLET BY MOUTH EVERY DAY 90 tablet 3   ? colchicine 0.6 mg tablet Take 1 tablet (0.6 mg total) by mouth daily. 30 tablet 5   ? FOLIC ACID/MULTIVITS-MIN/LUT (CENTRUM SILVER ORAL) Take 1 tablet by mouth daily.      ? naproxen (NAPROSYN) 250 MG tablet Initially, take 3 tabs (750 mg) x 1 dose by mouth for pain then 250 mg every 8 hours as needed until attack resolves. 12 tablet 0     No current  "facility-administered medications for this visit.       Objective:   Vital Signs:   Visit Vitals  /66   Pulse 61   Ht 5' 10.75\" (1.797 m)   Wt (!) 237 lb 4.8 oz (107.6 kg)   SpO2 99%   BMI 33.33 kg/m       VisionScreening:  No exam data present     PHYSICAL EXAM    General Appearance:    Alert, cooperative, no distress   Eyes:   No scleral icterus or conjunctival irritation       Ears:    Normal TM's and external ear canals, both ears   Throat:   Lips, mucosa, and tongue normal; teeth and gums normal   Neck:   Supple, symmetrical, trachea midline, no adenopathy;        thyroid:  No enlargement/tenderness/nodules   Lungs:     Clear to auscultation bilaterally, respirations unlabored, no wheezes or crackles   Heart:    Regular rate and rhythm,  No murmur   Abdomen:    Soft, no distention, no tenderness on palpation, no masses, no organomegaly     Extremities:  No edema, no joint swelling or redness, no evidence of any injuries, palpable dorsalis pedis pulses, palpable posterior tibialis pulses.  No ulcerations.  No significant callus formation.  No other foot deformities.   Skin:  No concerning skin findings, no suspicious moles, no rashes   Neurologic:  On gross examination there is no motor or sensory deficit.  Specific examination of the feet using the monofilament line reveals that there is no absence of sensation.  Patient walks with a normal gait       Genitalia:   Normal testicular anatomy, no inguinal hernias, no skin findings in the genital region   Rectal:    Normal tone, no hemorrhoids masses or other anal rectal findings, prostate has a smooth uniform consistency without nodules       Assessment Results 7/2/2021   Activities of Daily Living No help needed   Instrumental Activities of Daily Living No help needed   Mini Cog Total Score 5   Some recent data might be hidden     A Mini-Cog score of 0-2 suggests the possibility of dementia, score of 3-5 suggests no dementia    Identified Health Risks: "     Patient's advanced directive was discussed and I am comfortable with the patient's wishes.

## 2021-07-05 LAB — TESTOST SERPL-MCNC: 300 NG/DL (ref 221–716)

## 2021-07-06 VITALS
SYSTOLIC BLOOD PRESSURE: 124 MMHG | DIASTOLIC BLOOD PRESSURE: 66 MMHG | BODY MASS INDEX: 33.22 KG/M2 | HEART RATE: 61 BPM | WEIGHT: 237.3 LBS | HEIGHT: 71 IN | OXYGEN SATURATION: 99 %

## 2021-09-20 DIAGNOSIS — E78.5 HYPERLIPIDEMIA, UNSPECIFIED HYPERLIPIDEMIA TYPE: ICD-10-CM

## 2021-09-20 RX ORDER — ATORVASTATIN CALCIUM 10 MG/1
10 TABLET, FILM COATED ORAL DAILY
Qty: 90 TABLET | Refills: 3 | Status: SHIPPED | OUTPATIENT
Start: 2021-09-20 | End: 2022-07-01

## 2021-09-20 NOTE — TELEPHONE ENCOUNTER
"Routing refill request to provider for review/approval because:  Drug interaction warning    Last Written Prescription Date:  9/28/20  Last Fill Quantity: 12,  # refills: 0   Last office visit provider:  7/2/21     Requested Prescriptions   Pending Prescriptions Disp Refills     atorvastatin (LIPITOR) 10 MG tablet [Pharmacy Med Name: ATORVASTATIN 10 MG TABLET] 90 tablet 1     Sig: TAKE 1 TABLET BY MOUTH EVERY DAY       Statins Protocol Passed - 9/20/2021 12:30 AM        Passed - LDL on file in past 12 months     Recent Labs   Lab Test 07/02/21  0910                Passed - No abnormal creatine kinase in past 12 months     Recent Labs   Lab Test 09/04/18  1346                   Passed - Recent (12 mo) or future (30 days) visit within the authorizing provider's specialty     Patient has had an office visit with the authorizing provider or a provider within the authorizing providers department within the previous 12 mos or has a future within next 30 days. See \"Patient Info\" tab in inbasket, or \"Choose Columns\" in Meds & Orders section of the refill encounter.              Passed - Medication is active on med list        Passed - Patient is age 18 or older             Daniel Hill RN 09/20/21 9:50 AM  "

## 2021-10-17 ENCOUNTER — HEALTH MAINTENANCE LETTER (OUTPATIENT)
Age: 71
End: 2021-10-17

## 2021-12-21 DIAGNOSIS — M10.9 GOUT, UNSPECIFIED CAUSE, UNSPECIFIED CHRONICITY, UNSPECIFIED SITE: Primary | ICD-10-CM

## 2021-12-21 RX ORDER — ALLOPURINOL 100 MG/1
TABLET ORAL
Qty: 180 TABLET | Refills: 2 | OUTPATIENT
Start: 2021-12-21

## 2021-12-23 RX ORDER — ALLOPURINOL 100 MG/1
TABLET ORAL
Qty: 180 TABLET | Refills: 2 | Status: SHIPPED | OUTPATIENT
Start: 2021-12-23 | End: 2022-07-01

## 2021-12-23 NOTE — TELEPHONE ENCOUNTER
"Routing refill request to provider for review/approval because:  Labs not current:  CBC    Last Written Prescription Date:  7/2/2021 (but the class states it was not sent anywhere)    Last Fill Quantity: 180,  # refills: 0   Last office visit provider:  7/2/2021     Requested Prescriptions   Pending Prescriptions Disp Refills     allopurinol (ZYLOPRIM) 100 MG tablet [Pharmacy Med Name: ALLOPURINOL 100 MG TABLET] 180 tablet 2     Sig: TAKE 2 TABLETS BY MOUTH EVERY DAY       Gout Agents Protocol Failed - 12/21/2021  2:39 PM        Failed - CBC on file in past 12 months     Recent Labs   Lab Test 10/10/18  1057   WBC 5.3   RBC 4.84   HGB 14.4   HCT 44.8                    Passed - ALT on file in past 12 months     Recent Labs   Lab Test 07/02/21  0910   ALT 18             Passed - Has Uric Acid on file in past 12 months and value is less than 6     Recent Labs   Lab Test 07/02/21  0910   URIC 5.5     If level is 6mg/dL or greater, ok to refill one time and refer to provider.           Passed - Recent (12 mo) or future (30 days) visit within the authorizing provider's specialty     Patient has had an office visit with the authorizing provider or a provider within the authorizing providers department within the previous 12 mos or has a future within next 30 days. See \"Patient Info\" tab in inbasket, or \"Choose Columns\" in Meds & Orders section of the refill encounter.              Passed - Medication is active on med list        Passed - Patient is age 18 or older        Passed - Normal serum creatinine on file in the past 12 months     Recent Labs   Lab Test 07/02/21  0910   CR 1.12       Ok to refill medication if creatinine is low               Glory Ga RN 12/23/21 2:20 PM  "

## 2022-02-06 ENCOUNTER — HEALTH MAINTENANCE LETTER (OUTPATIENT)
Age: 72
End: 2022-02-06

## 2022-05-28 ENCOUNTER — HEALTH MAINTENANCE LETTER (OUTPATIENT)
Age: 72
End: 2022-05-28

## 2022-07-01 ENCOUNTER — OFFICE VISIT (OUTPATIENT)
Dept: FAMILY MEDICINE | Facility: CLINIC | Age: 72
End: 2022-07-01
Payer: COMMERCIAL

## 2022-07-01 VITALS
HEIGHT: 71 IN | BODY MASS INDEX: 34.16 KG/M2 | SYSTOLIC BLOOD PRESSURE: 128 MMHG | WEIGHT: 244 LBS | TEMPERATURE: 98.3 F | RESPIRATION RATE: 18 BRPM | OXYGEN SATURATION: 98 % | DIASTOLIC BLOOD PRESSURE: 76 MMHG

## 2022-07-01 DIAGNOSIS — E78.5 HYPERLIPIDEMIA, UNSPECIFIED HYPERLIPIDEMIA TYPE: ICD-10-CM

## 2022-07-01 DIAGNOSIS — Z12.5 SCREENING FOR PROSTATE CANCER: ICD-10-CM

## 2022-07-01 DIAGNOSIS — M10.9 GOUT, UNSPECIFIED CAUSE, UNSPECIFIED CHRONICITY, UNSPECIFIED SITE: ICD-10-CM

## 2022-07-01 DIAGNOSIS — Z00.00 ROUTINE GENERAL MEDICAL EXAMINATION AT A HEALTH CARE FACILITY: Primary | ICD-10-CM

## 2022-07-01 DIAGNOSIS — E11.9 TYPE 2 DIABETES MELLITUS WITHOUT COMPLICATION, WITHOUT LONG-TERM CURRENT USE OF INSULIN (H): ICD-10-CM

## 2022-07-01 LAB
ALBUMIN SERPL BCG-MCNC: 4.5 G/DL (ref 3.5–5.2)
ALP SERPL-CCNC: 84 U/L (ref 40–129)
ALT SERPL W P-5'-P-CCNC: 24 U/L (ref 10–50)
ANION GAP SERPL CALCULATED.3IONS-SCNC: 10 MMOL/L (ref 7–15)
AST SERPL W P-5'-P-CCNC: 29 U/L (ref 10–50)
BILIRUB SERPL-MCNC: 0.6 MG/DL
BUN SERPL-MCNC: 17.5 MG/DL (ref 8–23)
CALCIUM SERPL-MCNC: 9.3 MG/DL (ref 8.8–10.2)
CHLORIDE SERPL-SCNC: 103 MMOL/L (ref 98–107)
CHOLEST SERPL-MCNC: 190 MG/DL
CREAT SERPL-MCNC: 1.19 MG/DL (ref 0.67–1.17)
CREAT UR-MCNC: 119 MG/DL
DEPRECATED HCO3 PLAS-SCNC: 29 MMOL/L (ref 22–29)
GFR SERPL CREATININE-BSD FRML MDRD: 66 ML/MIN/1.73M2
GLUCOSE SERPL-MCNC: 142 MG/DL (ref 70–99)
HBA1C MFR BLD: 6.5 % (ref 0–5.6)
HDLC SERPL-MCNC: 41 MG/DL
LDLC SERPL CALC-MCNC: 114 MG/DL
MICROALBUMIN UR-MCNC: <12 MG/L
MICROALBUMIN/CREAT UR: NORMAL MG/G{CREAT}
NONHDLC SERPL-MCNC: 149 MG/DL
POTASSIUM SERPL-SCNC: 4.7 MMOL/L (ref 3.4–5.3)
PROT SERPL-MCNC: 6.3 G/DL (ref 6.4–8.3)
PSA SERPL-MCNC: 1.93 NG/ML (ref 0–6.5)
SODIUM SERPL-SCNC: 142 MMOL/L (ref 136–145)
TRIGL SERPL-MCNC: 174 MG/DL
URATE SERPL-MCNC: 6.3 MG/DL (ref 3.4–7)

## 2022-07-01 PROCEDURE — 99214 OFFICE O/P EST MOD 30 MIN: CPT | Mod: 25 | Performed by: FAMILY MEDICINE

## 2022-07-01 PROCEDURE — G0103 PSA SCREENING: HCPCS | Performed by: FAMILY MEDICINE

## 2022-07-01 PROCEDURE — 99397 PER PM REEVAL EST PAT 65+ YR: CPT | Performed by: FAMILY MEDICINE

## 2022-07-01 PROCEDURE — 80061 LIPID PANEL: CPT | Performed by: FAMILY MEDICINE

## 2022-07-01 PROCEDURE — 83036 HEMOGLOBIN GLYCOSYLATED A1C: CPT | Performed by: FAMILY MEDICINE

## 2022-07-01 PROCEDURE — 84550 ASSAY OF BLOOD/URIC ACID: CPT | Performed by: FAMILY MEDICINE

## 2022-07-01 PROCEDURE — 80053 COMPREHEN METABOLIC PANEL: CPT | Performed by: FAMILY MEDICINE

## 2022-07-01 PROCEDURE — 82043 UR ALBUMIN QUANTITATIVE: CPT | Performed by: FAMILY MEDICINE

## 2022-07-01 PROCEDURE — 36415 COLL VENOUS BLD VENIPUNCTURE: CPT | Performed by: FAMILY MEDICINE

## 2022-07-01 RX ORDER — UBIDECARENONE 100 MG
CAPSULE ORAL
COMMUNITY
Start: 2021-08-16

## 2022-07-01 RX ORDER — ATORVASTATIN CALCIUM 10 MG/1
10 TABLET, FILM COATED ORAL DAILY
Qty: 90 TABLET | Refills: 3 | Status: SHIPPED | OUTPATIENT
Start: 2022-07-01 | End: 2022-07-07

## 2022-07-01 RX ORDER — ALLOPURINOL 100 MG/1
200 TABLET ORAL DAILY
Qty: 180 TABLET | Refills: 3 | Status: SHIPPED | OUTPATIENT
Start: 2022-07-01 | End: 2022-10-14

## 2022-07-01 ASSESSMENT — ENCOUNTER SYMPTOMS
SHORTNESS OF BREATH: 0
ABDOMINAL PAIN: 0
COUGH: 0
EYE PAIN: 0
SORE THROAT: 0
MYALGIAS: 0
NAUSEA: 0
FREQUENCY: 0
NERVOUS/ANXIOUS: 0
CONSTIPATION: 0
HEMATOCHEZIA: 0
HEMATURIA: 0
ARTHRALGIAS: 0
DIARRHEA: 0
JOINT SWELLING: 0
PARESTHESIAS: 0
HEADACHES: 0
WEAKNESS: 0
DIZZINESS: 0
CHILLS: 0
DYSURIA: 0
PALPITATIONS: 0
HEARTBURN: 0
FEVER: 0

## 2022-07-01 ASSESSMENT — ACTIVITIES OF DAILY LIVING (ADL): CURRENT_FUNCTION: NO ASSISTANCE NEEDED

## 2022-07-01 NOTE — PROGRESS NOTES
SUBJECTIVE:   Evaristo Lindsey is a 72 year old male who presents for Preventive Visit.    He has diet-controlled type 2 diabetes.  Has only followed up historically once a year.  Discussed the benefits of twice yearly follow-up to ensure adequate control of blood sugar.  He has declined medications in the past and based on his assessments previously as well as today it is reasonable to continue to forego medication.  We spent some time reinforcing the principles of diet control and the importance of monitoring.  We discussed other considerations including eye care for which she is up-to-date without any history of retinopathy, potential for vascular disease and/or neuropathy affecting the lower extremities there are no complaints and a foot exam is performed today, we discussed the possibility of kidney disease and have elected to obtain continued blood testing to measure GFR as well as urine test to check for microalbumin.  His blood pressure is well controlled.  He does not historically have any history of kidney problems based on testing.  He is on a low-dose statin.  From an ideal standpoint we should consider increasing but he is tolerating it at 10 mg currently with favorable numbers and we have elected to continue.  We also discussed the benefits of continuation of baby aspirin as he has no significant contraindication to this and should help lower his vascular disease risk based on higher risk from his diabetes.    He has a history of gout but reports no attacks he is on allopurinol.    He has obstructive sleep apnea uses CPAP follows with sleep specialist reports no concerns.    Discussed routine screening including preventive strategies such as immunizations.  Reviewed colonoscopy report.  Discussed prostate cancer screening.    Patient has been advised of split billing requirements and indicates understanding: Yes  Are you in the first 12 months of your Medicare coverage?  No    Do you feel safe in your  "environment? Yes    Have you ever done Advance Care Planning? (For example, a Health Directive, POLST, or a discussion with a medical provider or your loved ones about your wishes): Yes, advance care planning is on file.      Cognitive Screening   1) Repeat 3 items (Leader, Season, Table)   2) Clock draw: NORMAL  3) 3 item recall: Recalls 3 objects  Results: 3 items recalled: COGNITIVE IMPAIRMENT LESS LIKELY    Mini-CogTM Copyright ASHLI Kuhn. Licensed by the author for use in [facility]; reprinted with permission (camille@[facility]). All rights reserved.      Do you have sleep apnea, excessive snoring or daytime drowsiness?: yes    Reviewed and updated as needed this visit by clinical staff    Reviewed and updated as needed this visit by Provider    Social History     Tobacco Use     Smoking status: Never Smoker     Smokeless tobacco: Never Used   Substance Use Topics     Alcohol use: Yes     Comment: Alcoholic Drinks/day: rarely     If you drink alcohol do you typically have >3 drinks per day or >7 drinks per week? No    No flowsheet data found.      Current providers sharing in care for this patient include:   Patient Care Team:  Guillermo Wharton MD as PCP - General (Family Practice)  Guillermo Wharton MD as Assigned PCP    The following health maintenance items are reviewed in Epic and correct as of today:  Health Maintenance Due   Topic Date Due     DIABETIC FOOT EXAM  Never done     ANNUAL REVIEW OF HM ORDERS  Never done     EYE EXAM  Never done     MICROALBUMIN  07/15/2020     COVID-19 Vaccine (4 - Booster for Pfizer series) 01/25/2022     BMP  07/02/2022     LIPID  07/02/2022               Review of Systems  Complete review of systems is obtained.  Other than the specific considerations noted above complete review of systems is negative.      OBJECTIVE:   /76   Temp 98.3  F (36.8  C)   Resp 18   Ht 1.797 m (5' 10.75\")   Wt 110.7 kg (244 lb)   SpO2 98%   BMI 34.27 kg/m   Estimated body mass " "index is 34.27 kg/m  as calculated from the following:    Height as of this encounter: 1.797 m (5' 10.75\").    Weight as of this encounter: 110.7 kg (244 lb).  Physical Exam          General Appearance:    Alert, cooperative, no distress   Eyes:   No scleral icterus or conjunctival irritation       Ears:    Normal TM's and external ear canals, both ears   Throat:   Lips, mucosa, and tongue normal; teeth and gums normal   Neck:   Supple, symmetrical, trachea midline, no adenopathy;        thyroid:  No enlargement/tenderness/nodules   Lungs:     Clear to auscultation bilaterally, respirations unlabored, no wheezesor crackles   Heart:    Regular rate and rhythm,  No murmur   Abdomen:    Soft, no distention, no tenderness on palpation, no masses, no organomegaly     Extremities:  No edema, no jointswelling or redness, no evidence of any injuries, faintly palpable posterior tibialis pulses bilaterally.  No ulcerations.  No significant callus formation.  No other foot deformities.   Skin:  Noconcerning skin findings, no suspicious moles, no rashes   Neurologic:  On gross examination there is no motor or sensory deficit.  Specific examination of the feet using the monofilament line revealsthat there is no absence of sensation.  Patient walks with a normal gait                 ASSESSMENT / PLAN:   Evaristo was seen today for wellness visit, diabetes, recheck medication and medication question.    Diagnoses and all orders for this visit:    Gout, unspecified cause, unspecified chronicity, unspecified site  -     Uric acid  -     allopurinol (ZYLOPRIM) 100 MG tablet; Take 2 tablets (200 mg) by mouth daily    Hyperlipidemia, unspecified hyperlipidemia type  -     Comprehensive metabolic panel (BMP + Alb, Alk Phos, ALT, AST, Total. Bili, TP)  -     Lipid panel reflex to direct LDL Fasting  -     atorvastatin (LIPITOR) 10 MG tablet; Take 1 tablet (10 mg) by mouth daily    Type 2 diabetes mellitus without complication, without " "long-term current use of insulin (H)  -     Comprehensive metabolic panel (BMP + Alb, Alk Phos, ALT, AST, Total. Bili, TP)  -     Hemoglobin A1c  -     Albumin Random Urine Quantitative with Creat Ratio    Routine general medical examination at a health care facility    Screening for prostate cancer  -     PSA, screen    Gout           Patient has been advised of split billing requirements and indicates understanding: Yes    COUNSELING:  Reviewed preventive health counseling, as reflected in patient instructions       Regular exercise       Healthy diet/nutrition       Vision screening       Dental care       Colon cancer screening       Prostate cancer screening    Estimated body mass index is 34.27 kg/m  as calculated from the following:    Height as of this encounter: 1.797 m (5' 10.75\").    Weight as of this encounter: 110.7 kg (244 lb).    Weight management plan: Discussed healthy diet and exercise guidelines    He reports that he has never smoked. He has never used smokeless tobacco.      Appropriate preventive services were discussed with this patient, including applicable screening as appropriate for cardiovascular disease, diabetes, osteopenia/osteoporosis, and glaucoma.  As appropriate for age/gender, discussed screening for colorectal cancer, prostate cancer, breast cancer, and cervical cancer. Checklist reviewing preventive services available has been given to the patient.    Reviewed patients plan of care and provided an AVS. The Basic Care Plan (routine screening as documented in Health Maintenance) for Evaristo meets the Care Plan requirement. This Care Plan has been established and reviewed with the Patient.    Counseling Resources:  ATP IV Guidelines  Pooled Cohorts Equation Calculator  Breast Cancer Risk Calculator  Breast Cancer: Medication to Reduce Risk  FRAX Risk Assessment  ICSI Preventive Guidelines  Dietary Guidelines for Americans, 2010  USDA's MyPlate  ASA Prophylaxis  Lung CA " Screening    Guillermo Wharton MD, MD  [facility]    Identified Health Risks:  Answers for HPI/ROS submitted by the patient on 7/1/2022  Duration of exercise:: Greater than 60 minutes    Wt Readings from Last 3 Encounters:   07/01/22 110.7 kg (244 lb)   07/02/21 107.6 kg (237 lb 4.8 oz)   08/28/20 109.8 kg (242 lb)        BP Readings from Last 6 Encounters:   07/01/22 128/76   07/02/21 124/66   08/28/20 134/76        Hemoglobin A1C   Date Value Ref Range Status   07/01/2022 6.5 (H) 0.0 - 5.6 % Final     Comment:     Normal <5.7%   Prediabetes 5.7-6.4%    Diabetes 6.5% or higher     Note: Adopted from ADA consensus guidelines.   07/02/2021 6.2 (H) <=5.6 % Final   08/31/2020 6.7 (H) <=5.6 % Final     Comment:     Normal <5.7% Prediabete 5.7-6.4% Diabletes 6.5% or higher - adopted from ADA consensus guidelines

## 2022-07-07 DIAGNOSIS — E78.5 HYPERLIPIDEMIA, UNSPECIFIED HYPERLIPIDEMIA TYPE: Primary | ICD-10-CM

## 2022-07-07 DIAGNOSIS — E11.9 TYPE 2 DIABETES MELLITUS WITHOUT COMPLICATION, WITHOUT LONG-TERM CURRENT USE OF INSULIN (H): ICD-10-CM

## 2022-07-07 RX ORDER — ATORVASTATIN CALCIUM 20 MG/1
20 TABLET, FILM COATED ORAL DAILY
Qty: 90 TABLET | Refills: 3 | Status: SHIPPED | OUTPATIENT
Start: 2022-07-07 | End: 2024-06-24 | Stop reason: DRUGHIGH

## 2022-08-12 ENCOUNTER — MYC MEDICAL ADVICE (OUTPATIENT)
Dept: FAMILY MEDICINE | Facility: CLINIC | Age: 72
End: 2022-08-12

## 2022-08-18 ENCOUNTER — TRANSFERRED RECORDS (OUTPATIENT)
Dept: HEALTH INFORMATION MANAGEMENT | Facility: CLINIC | Age: 72
End: 2022-08-18

## 2022-08-18 LAB — RETINOPATHY: NEGATIVE

## 2022-09-09 ENCOUNTER — OFFICE VISIT (OUTPATIENT)
Dept: FAMILY MEDICINE | Facility: CLINIC | Age: 72
End: 2022-09-09
Payer: COMMERCIAL

## 2022-09-09 VITALS
DIASTOLIC BLOOD PRESSURE: 76 MMHG | WEIGHT: 239 LBS | RESPIRATION RATE: 16 BRPM | OXYGEN SATURATION: 97 % | BODY MASS INDEX: 33.57 KG/M2 | TEMPERATURE: 98.7 F | SYSTOLIC BLOOD PRESSURE: 150 MMHG | HEART RATE: 56 BPM

## 2022-09-09 DIAGNOSIS — H61.22 IMPACTED CERUMEN OF LEFT EAR: Primary | ICD-10-CM

## 2022-09-09 PROCEDURE — 69209 REMOVE IMPACTED EAR WAX UNI: CPT | Mod: LT | Performed by: FAMILY MEDICINE

## 2022-09-09 NOTE — PROGRESS NOTES
Assessment & Plan     Impacted cerumen of left ear  The earwax was removed completely with resolution of his hearing issues.  Was was removed by lavage through nursing and myself.    Patient is aware his blood pressure was elevated here and he needs follow-up with his regular physician                   No follow-ups on file.    Isaac Turner MD  [facility]    Tawanda Loera is a 72 year old, presenting for the following health issues:  Plugged Ears (X1 week, both ears plugged/)      HPI     72-year-old here because of poor hearing out of his left ear.  There is no pain.  He has no history of hearing problems      Review of Systems         Objective    BP (!) 150/76 (BP Location: Right arm, Patient Position: Sitting, Cuff Size: Adult Large)   Pulse 56   Temp 98.7  F (37.1  C) (Oral)   Resp 16   Wt 108.4 kg (239 lb)   SpO2 97%   BMI 33.57 kg/m    Body mass index is 33.57 kg/m .  Physical Exam  Vitals and nursing note reviewed.   Constitutional:       Appearance: Normal appearance.   Neurological:      Mental Status: He is alert.        Left ear canal is occluded with cerumen

## 2022-10-01 ENCOUNTER — HEALTH MAINTENANCE LETTER (OUTPATIENT)
Age: 72
End: 2022-10-01

## 2022-10-14 ENCOUNTER — OFFICE VISIT (OUTPATIENT)
Dept: FAMILY MEDICINE | Facility: CLINIC | Age: 72
End: 2022-10-14
Payer: COMMERCIAL

## 2022-10-14 VITALS
SYSTOLIC BLOOD PRESSURE: 134 MMHG | BODY MASS INDEX: 33.18 KG/M2 | RESPIRATION RATE: 18 BRPM | HEART RATE: 56 BPM | OXYGEN SATURATION: 98 % | WEIGHT: 237 LBS | DIASTOLIC BLOOD PRESSURE: 76 MMHG | HEIGHT: 71 IN

## 2022-10-14 DIAGNOSIS — M10.9 GOUT, UNSPECIFIED CAUSE, UNSPECIFIED CHRONICITY, UNSPECIFIED SITE: ICD-10-CM

## 2022-10-14 DIAGNOSIS — E11.9 TYPE 2 DIABETES MELLITUS WITHOUT COMPLICATION, WITHOUT LONG-TERM CURRENT USE OF INSULIN (H): Primary | ICD-10-CM

## 2022-10-14 LAB
ANION GAP SERPL CALCULATED.3IONS-SCNC: 9 MMOL/L (ref 7–15)
BUN SERPL-MCNC: 22 MG/DL (ref 8–23)
CALCIUM SERPL-MCNC: 9.4 MG/DL (ref 8.8–10.2)
CHLORIDE SERPL-SCNC: 104 MMOL/L (ref 98–107)
CHOLEST SERPL-MCNC: 177 MG/DL
CREAT SERPL-MCNC: 1.22 MG/DL (ref 0.67–1.17)
DEPRECATED HCO3 PLAS-SCNC: 28 MMOL/L (ref 22–29)
GFR SERPL CREATININE-BSD FRML MDRD: 64 ML/MIN/1.73M2
GLUCOSE SERPL-MCNC: 131 MG/DL (ref 70–99)
HBA1C MFR BLD: 6.4 % (ref 0–5.6)
HDLC SERPL-MCNC: 46 MG/DL
LDLC SERPL CALC-MCNC: 102 MG/DL
NONHDLC SERPL-MCNC: 131 MG/DL
POTASSIUM SERPL-SCNC: 4.5 MMOL/L (ref 3.4–5.3)
SODIUM SERPL-SCNC: 141 MMOL/L (ref 136–145)
TRIGL SERPL-MCNC: 146 MG/DL

## 2022-10-14 PROCEDURE — G0008 ADMIN INFLUENZA VIRUS VAC: HCPCS | Performed by: FAMILY MEDICINE

## 2022-10-14 PROCEDURE — 80061 LIPID PANEL: CPT | Performed by: FAMILY MEDICINE

## 2022-10-14 PROCEDURE — 99214 OFFICE O/P EST MOD 30 MIN: CPT | Mod: 25 | Performed by: FAMILY MEDICINE

## 2022-10-14 PROCEDURE — 90662 IIV NO PRSV INCREASED AG IM: CPT | Performed by: FAMILY MEDICINE

## 2022-10-14 PROCEDURE — 83036 HEMOGLOBIN GLYCOSYLATED A1C: CPT | Performed by: FAMILY MEDICINE

## 2022-10-14 PROCEDURE — 36415 COLL VENOUS BLD VENIPUNCTURE: CPT | Performed by: FAMILY MEDICINE

## 2022-10-14 PROCEDURE — 80048 BASIC METABOLIC PNL TOTAL CA: CPT | Performed by: FAMILY MEDICINE

## 2022-10-14 RX ORDER — ALLOPURINOL 100 MG/1
200 TABLET ORAL DAILY
Qty: 180 TABLET | Refills: 3 | Status: SHIPPED | OUTPATIENT
Start: 2022-10-14 | End: 2023-06-30

## 2022-10-14 ASSESSMENT — PAIN SCALES - GENERAL: PAINLEVEL: NO PAIN (0)

## 2022-10-14 NOTE — PROGRESS NOTES
"Evaristo Lindsey  /76   Pulse 56   Resp 18   Ht 1.797 m (5' 10.75\")   Wt 107.5 kg (237 lb)   SpO2 98%   BMI 33.29 kg/m       Assessment/Plan:                Evaristo was seen today for recheck medication and diabetes.    Diagnoses and all orders for this visit:    Type 2 diabetes mellitus without complication, without long-term current use of insulin (H)  -     Hemoglobin A1c; Future  -     Basic metabolic panel  (Ca, Cl, CO2, Creat, Gluc, K, Na, BUN); Future  -     Hemoglobin A1c  -     Basic metabolic panel  (Ca, Cl, CO2, Creat, Gluc, K, Na, BUN)    Gout, unspecified cause, unspecified chronicity, unspecified site  -     allopurinol (ZYLOPRIM) 100 MG tablet; Take 2 tablets (200 mg) by mouth daily    Other orders  -     INFLUENZA, QUAD, HD, PF, 65+ (FLUZONE HD)  -     REVIEW OF HEALTH MAINTENANCE PROTOCOL ORDERS         DISCUSSION  Obtain additional labs as noted.  Continue current medication therapy without any any medication for diabetes at this time.  Continue efforts at further diet improvement and maintaining exercise regiment.  Continue to monitor for any symptom concerns and notify me more immediately.  Based on repeat labs will consider need for further evaluation and/or change in treatment.  He will notify me when he knows more information about his brother's kidney condition as discussed in more detail below.  Subjective:     HPI:    Evaristo Lindsey is a 72 year old male he has diet-controlled type 2 diabetes.  Last A1c was 6.5.  We elected to hold off on medications at that time.  A1c is 6.4.  He is working on diet improvement and maintaining exercise.  Walks 6 miles per day.  Weight down slightly.  Overall feels well.  Discussed possibility of starting medication therapy.  Again elected to hold off for the time being with continued close monitoring and continued efforts to control blood sugar overall.    His brother has some sort of significant kidney abnormality that would likely in the " short-term result in him having to go on dialysis in a relatively short timeframe from having normal kidney function.  Patient reports he has similar health concerns to his brother.  He will find out more details and we will determine if we need any more specific testing.  We reviewed today the testing of his kidneys that we have engaged in up to this point.  There was a mild reduction in his GFR at his last visit compared to previous.  We discussed ways he can keep his kidneys healthy.  We will discussed how we will go about monitoring his kidney function.    We discussed vascular disease risk reduction.  His atorvastatin dose was increased slightly at last visit we will recheck cholesterol numbers and consider further dose titration.  Blood pressure is reasonable.  We will continue to monitor closely.  He has no history of microalbuminuria based on testing at last visit.  He had a foot exam performed at last visit does not report any new findings or concerns no neuropathy symptoms were present at that time.        ROS:  Complete review of systems is obtained.  Other than the specific considerations noted above complete review of systems is negative.          Objective:   Medications:  Current Outpatient Medications   Medication     allopurinol (ZYLOPRIM) 100 MG tablet     aspirin 81 MG EC tablet     atorvastatin (LIPITOR) 20 MG tablet     co-enzyme Q-10 100 MG CAPS capsule     FOLIC ACID/MULTIVITS-MIN/LUT (CENTRUM SILVER ORAL)     naproxen (NAPROSYN) 250 MG tablet     No current facility-administered medications for this visit.        Allergies:   No Known Allergies     Social History     Socioeconomic History     Marital status:      Spouse name: Not on file     Number of children: Not on file     Years of education: Not on file     Highest education level: Not on file   Occupational History     Not on file   Tobacco Use     Smoking status: Never     Smokeless tobacco: Never   Substance and Sexual Activity      Alcohol use: Yes     Comment: Alcoholic Drinks/day: rarely     Drug use: No     Sexual activity: Not on file   Other Topics Concern     Not on file   Social History Narrative     Not on file     Social Determinants of Health     Financial Resource Strain: Not on file   Food Insecurity: Not on file   Transportation Needs: Not on file   Physical Activity: Not on file   Stress: Not on file   Social Connections: Not on file   Intimate Partner Violence: Not on file   Housing Stability: Not on file       Family History   Problem Relation Age of Onset     Diabetes Mother      Diabetes Maternal Grandmother      Diabetes Maternal Grandfather         Most Recent Immunizations   Administered Date(s) Administered     COVID-19,PF,Pfizer (12+ Yrs) 09/25/2021     COVID-19,PF,Pfizer 12+ YRS BIVALENT Booster 10/07/2022     COVID-19,PF,Pfizer 12+ Yrs (2022 and After) 07/02/2022     DT (PEDS <7y) 04/01/1999     FLU 6-35 months 10/26/2009     Flu, Unspecified 08/24/2015     HepA, Unspecified 02/11/2013     HepA-Adult 02/11/2013     Influenza (High Dose) 3 valent vaccine 09/16/2019     Influenza (IIV3) PF 10/01/2014     Influenza Vaccine IM > 6 months Valent IIV4 (Alfuria,Fluzone) 09/21/2016     Influenza Vaccine, 6+MO IM (QUADRIVALENT W/PRESERVATIVES) 09/21/2016     Influenza, Quad, High Dose, Pf, 65yr+ (Fluzone HD) 10/14/2022     Pneumo Conj 13-V (2010&after) 06/30/2017     Pneumococcal 23 valent 07/10/2018     Td (Adult), Adsorbed 04/30/1993     Tdap (Adacel,Boostrix) 10/15/2018     Zoster vaccine recombinant adjuvanted (SHINGRIX) 08/29/2019     Zoster vaccine, live 10/21/2014        Wt Readings from Last 3 Encounters:   10/14/22 107.5 kg (237 lb)   09/09/22 108.4 kg (239 lb)   07/01/22 110.7 kg (244 lb)        BP Readings from Last 6 Encounters:   10/14/22 134/76   09/09/22 (!) 150/76   07/01/22 128/76   07/02/21 124/66   08/28/20 134/76        Hemoglobin A1C   Date Value Ref Range Status   10/14/2022 6.4 (H) 0.0 - 5.6 % Final  "    Comment:     Normal <5.7%   Prediabetes 5.7-6.4%    Diabetes 6.5% or higher     Note: Adopted from ADA consensus guidelines.   07/01/2022 6.5 (H) 0.0 - 5.6 % Final     Comment:     Normal <5.7%   Prediabetes 5.7-6.4%    Diabetes 6.5% or higher     Note: Adopted from ADA consensus guidelines.   07/02/2021 6.2 (H) <=5.6 % Final        PHYSICAL EXAM:    /76   Pulse 56   Resp 18   Ht 1.797 m (5' 10.75\")   Wt 107.5 kg (237 lb)   SpO2 98%   BMI 33.29 kg/m           General Appearance:    Alert, cooperative, no distress   Eyes:   No scleral icterus or conjunctival irritation       Lungs:     Clear to auscultation bilaterally, respirations unlabored, no wheezes or crackles   Heart:    Regular rate and rhythm,  No murmur   Extremities:  No edema, no joint swelling or redness, no evidence of any injuries   Skin:  No concerning skin findings, no suspicious moles, no rashes   Neurologic:  On gross examination there is no motor or sensory deficit.  Patient walks with a normal gait             Answers for HPI/ROS submitted by the patient on 10/7/2022  Frequency of checking blood sugars:: not at all  Diabetic concerns:: none  Paraesthesia present:: none of these symptoms  How many servings of fruits and vegetables do you eat daily?: 2-3  On average, how many sweetened beverages do you drink each day (Examples: soda, juice, sweet tea, etc.  Do NOT count diet or artificially sweetened beverages)?: 0  How many minutes a day do you exercise enough to make your heart beat faster?: 60 or more  How many days a week do you exercise enough to make your heart beat faster?: 7  How many days per week do you miss taking your medication?: 0                        "

## 2023-02-05 ENCOUNTER — HEALTH MAINTENANCE LETTER (OUTPATIENT)
Age: 73
End: 2023-02-05

## 2023-04-25 ENCOUNTER — LAB (OUTPATIENT)
Dept: LAB | Facility: CLINIC | Age: 73
End: 2023-04-25
Payer: COMMERCIAL

## 2023-04-25 DIAGNOSIS — R73.01 IMPAIRED FASTING GLUCOSE: Primary | ICD-10-CM

## 2023-04-25 LAB — HBA1C MFR BLD: 6.5 % (ref 0–5.6)

## 2023-04-25 PROCEDURE — 36415 COLL VENOUS BLD VENIPUNCTURE: CPT

## 2023-04-25 PROCEDURE — 83036 HEMOGLOBIN GLYCOSYLATED A1C: CPT

## 2023-06-23 ASSESSMENT — ENCOUNTER SYMPTOMS
ARTHRALGIAS: 0
HEMATURIA: 0
ABDOMINAL PAIN: 0
SORE THROAT: 0
WEAKNESS: 0
SHORTNESS OF BREATH: 0
HEARTBURN: 0
NAUSEA: 0
FREQUENCY: 0
EYE PAIN: 0
NERVOUS/ANXIOUS: 0
MYALGIAS: 0
DIARRHEA: 0
CONSTIPATION: 0
DIZZINESS: 0
JOINT SWELLING: 0
PALPITATIONS: 0
HEADACHES: 0
HEMATOCHEZIA: 0
CHILLS: 0
PARESTHESIAS: 0
COUGH: 0
FEVER: 0
DYSURIA: 0

## 2023-06-23 ASSESSMENT — ACTIVITIES OF DAILY LIVING (ADL): CURRENT_FUNCTION: NO ASSISTANCE NEEDED

## 2023-06-26 ENCOUNTER — LAB (OUTPATIENT)
Dept: LAB | Facility: CLINIC | Age: 73
End: 2023-06-26
Payer: COMMERCIAL

## 2023-06-26 DIAGNOSIS — E11.9 TYPE 2 DIABETES MELLITUS WITHOUT COMPLICATION, WITHOUT LONG-TERM CURRENT USE OF INSULIN (H): Primary | ICD-10-CM

## 2023-06-26 DIAGNOSIS — Z12.5 SCREENING FOR PROSTATE CANCER: ICD-10-CM

## 2023-06-26 DIAGNOSIS — M25.50 POLYARTHRALGIA: ICD-10-CM

## 2023-06-26 DIAGNOSIS — E78.5 HYPERLIPIDEMIA, UNSPECIFIED HYPERLIPIDEMIA TYPE: ICD-10-CM

## 2023-06-26 LAB
ALBUMIN SERPL BCG-MCNC: 4.7 G/DL (ref 3.5–5.2)
ALP SERPL-CCNC: 92 U/L (ref 40–129)
ALT SERPL W P-5'-P-CCNC: 26 U/L (ref 0–70)
ANION GAP SERPL CALCULATED.3IONS-SCNC: 11 MMOL/L (ref 7–15)
AST SERPL W P-5'-P-CCNC: 31 U/L (ref 0–45)
BILIRUB SERPL-MCNC: 0.4 MG/DL
BUN SERPL-MCNC: 18.1 MG/DL (ref 8–23)
CALCIUM SERPL-MCNC: 9.6 MG/DL (ref 8.8–10.2)
CHLORIDE SERPL-SCNC: 102 MMOL/L (ref 98–107)
CHOLEST SERPL-MCNC: 176 MG/DL
CREAT SERPL-MCNC: 1.11 MG/DL (ref 0.67–1.17)
CREAT UR-MCNC: 25.1 MG/DL
DEPRECATED HCO3 PLAS-SCNC: 27 MMOL/L (ref 22–29)
GFR SERPL CREATININE-BSD FRML MDRD: 71 ML/MIN/1.73M2
GLUCOSE SERPL-MCNC: 117 MG/DL (ref 70–99)
HDLC SERPL-MCNC: 46 MG/DL
HOLD SPECIMEN: NORMAL
LDLC SERPL CALC-MCNC: 106 MG/DL
MICROALBUMIN UR-MCNC: <12 MG/L
MICROALBUMIN/CREAT UR: NORMAL MG/G{CREAT}
NONHDLC SERPL-MCNC: 130 MG/DL
POTASSIUM SERPL-SCNC: 4.5 MMOL/L (ref 3.4–5.3)
PROT SERPL-MCNC: 7.1 G/DL (ref 6.4–8.3)
PSA SERPL DL<=0.01 NG/ML-MCNC: 2.59 NG/ML (ref 0–6.5)
SODIUM SERPL-SCNC: 140 MMOL/L (ref 136–145)
TRIGL SERPL-MCNC: 119 MG/DL
URATE SERPL-MCNC: 4.8 MG/DL (ref 3.4–7)

## 2023-06-26 PROCEDURE — 82043 UR ALBUMIN QUANTITATIVE: CPT

## 2023-06-26 PROCEDURE — 82570 ASSAY OF URINE CREATININE: CPT

## 2023-06-26 PROCEDURE — 36415 COLL VENOUS BLD VENIPUNCTURE: CPT

## 2023-06-26 PROCEDURE — G0103 PSA SCREENING: HCPCS

## 2023-06-26 PROCEDURE — 80061 LIPID PANEL: CPT

## 2023-06-26 PROCEDURE — 84550 ASSAY OF BLOOD/URIC ACID: CPT

## 2023-06-26 PROCEDURE — 80053 COMPREHEN METABOLIC PANEL: CPT

## 2023-06-30 ENCOUNTER — OFFICE VISIT (OUTPATIENT)
Dept: FAMILY MEDICINE | Facility: CLINIC | Age: 73
End: 2023-06-30
Payer: COMMERCIAL

## 2023-06-30 VITALS
BODY MASS INDEX: 32.06 KG/M2 | RESPIRATION RATE: 18 BRPM | HEART RATE: 62 BPM | SYSTOLIC BLOOD PRESSURE: 132 MMHG | DIASTOLIC BLOOD PRESSURE: 76 MMHG | WEIGHT: 229 LBS | HEIGHT: 71 IN | OXYGEN SATURATION: 96 % | TEMPERATURE: 98.6 F

## 2023-06-30 DIAGNOSIS — Z00.00 ENCOUNTER FOR MEDICARE ANNUAL WELLNESS EXAM: ICD-10-CM

## 2023-06-30 DIAGNOSIS — E78.5 HYPERLIPIDEMIA, UNSPECIFIED HYPERLIPIDEMIA TYPE: ICD-10-CM

## 2023-06-30 DIAGNOSIS — E11.9 TYPE 2 DIABETES MELLITUS WITHOUT COMPLICATION, WITHOUT LONG-TERM CURRENT USE OF INSULIN (H): ICD-10-CM

## 2023-06-30 DIAGNOSIS — Z00.00 ROUTINE GENERAL MEDICAL EXAMINATION AT A HEALTH CARE FACILITY: Primary | ICD-10-CM

## 2023-06-30 DIAGNOSIS — M10.9 GOUT, UNSPECIFIED CAUSE, UNSPECIFIED CHRONICITY, UNSPECIFIED SITE: ICD-10-CM

## 2023-06-30 PROCEDURE — G0439 PPPS, SUBSEQ VISIT: HCPCS | Performed by: FAMILY MEDICINE

## 2023-06-30 PROCEDURE — 99214 OFFICE O/P EST MOD 30 MIN: CPT | Mod: 25 | Performed by: FAMILY MEDICINE

## 2023-06-30 PROCEDURE — 99207 PR FOOT EXAM NO CHARGE: CPT | Performed by: FAMILY MEDICINE

## 2023-06-30 RX ORDER — ATORVASTATIN CALCIUM 40 MG/1
40 TABLET, FILM COATED ORAL DAILY
Qty: 90 TABLET | Refills: 3 | Status: SHIPPED | OUTPATIENT
Start: 2023-06-30 | End: 2024-07-01

## 2023-06-30 RX ORDER — ALLOPURINOL 100 MG/1
200 TABLET ORAL DAILY
Qty: 180 TABLET | Refills: 3 | Status: SHIPPED | OUTPATIENT
Start: 2023-06-30 | End: 2024-08-07

## 2023-06-30 RX ORDER — ATORVASTATIN CALCIUM 20 MG/1
20 TABLET, FILM COATED ORAL DAILY
Qty: 90 TABLET | Refills: 3 | Status: CANCELLED | OUTPATIENT
Start: 2023-06-30

## 2023-06-30 ASSESSMENT — ACTIVITIES OF DAILY LIVING (ADL)
WEAR_GLASSES_OR_BLIND: YES
DIFFICULTY_EATING/SWALLOWING: NO
DIFFICULTY_COMMUNICATING: NO
DOING_ERRANDS_INDEPENDENTLY_DIFFICULTY: NO
FALL_HISTORY_WITHIN_LAST_SIX_MONTHS: NO
HEARING_DIFFICULTY_OR_DEAF: NO
WALKING_OR_CLIMBING_STAIRS_DIFFICULTY: NO
CHANGE_IN_FUNCTIONAL_STATUS_SINCE_ONSET_OF_CURRENT_ILLNESS/INJURY: NO
CONCENTRATING,_REMEMBERING_OR_MAKING_DECISIONS_DIFFICULTY: NO
DRESSING/BATHING_DIFFICULTY: NO
TOILETING_ISSUES: NO

## 2023-06-30 ASSESSMENT — PAIN SCALES - GENERAL: PAINLEVEL: NO PAIN (0)

## 2023-06-30 NOTE — PROGRESS NOTES
SUBJECTIVE:   Evaristo is a 73 year old who presents for Preventive Visit.                 His diet controled diabetes. His A1c's have been consistently between 6.2 and 6.7 over the past four years. His most recent A1c was obtained in late April and was 6.5. Discussed the importance of maintaining good control through diet and exercise at this point in time. His blood pressure is reasonably controlled. He has normal kidney function without microalbuminuria. He is on atorvastatin 20 mg. LDL is only on the 100 range, discussed increasing atorvastatin dose to further lower LDL cholesterol for further risk reduction. He remains on a baby aspirin. He does not have chest pain or shortness of breath. He has no history of retinopathy, eye exam typically obtained in about October. Foot exam is performed today. No concerns noted with his feet, no history or symptoms of neuropathy.    He is a history of gout. His uric acid level is suppressed on allopurinol without any concerns about symptoms at this point in time.    He is obstructive sleep apnea falls with specialist at [facility]. Most recent consultation note reviewed. Patient reports no issues associated with his sleep apnea.    From a routine health prevention standpoint receives regular routine eye care and dental care, his immunizations are up-to-date and reviewed and discussed today. He has regular prostate cancer screening on a yearly basis without any concerns noted including on recent laboratory testing of PSA. His colonoscopy will come due for recheck based on a five-year follow-up in 2019 and August 2024.    Are you in the first 12 months of your Medicare coverage?  No    Have you ever done Advance Care Planning? (For example, a Health Directive, POLST, or a discussion with a medical provider or your loved ones about your wishes): Yes, advance care planning is on file.    Cognitive Screening: PASS   1) Repeat 3 items: 3/3   2) Clock draw: 100%  3) 3 item  recall: Recalls 3 objects  Results: 3 items recalled: COGNITIVE IMPAIRMENT LESS LIKELY    Mini-CogTM Copyright ASHLI Kuhn. Licensed by the author for use in [facility]; reprinted with permission (camille@.[facility]). All rights reserved.      Do you have sleep apnea, excessive snoring or daytime drowsiness?: no    Reviewed and updated as needed this visit by clinical staff    Reviewed and updated as needed this visit by Provider    Social History     Tobacco Use     Smoking status: Never     Smokeless tobacco: Never   Substance Use Topics     Alcohol use: Yes     Comment: Alcoholic Drinks/day: rarely             6/23/2023     9:53 AM   Alcohol Use   Prescreen: >3 drinks/day or >7 drinks/week? No          No data to display              Do you have a current opioid prescription? No  Do you use any other controlled substances or medications that are not prescribed by a provider? None      Current providers sharing in care for this patient include:   Patient Care Team:  Guillermo Wharton MD as PCP - General (Family Practice)  Guillermo Wharton MD as Assigned PCP    The following health maintenance items are reviewed in Epic and correct as of today:  Health Maintenance   Topic Date Due     DIABETIC FOOT EXAM  Never done     MEDICARE ANNUAL WELLNESS VISIT  07/01/2023     A1C  07/25/2023     EYE EXAM  08/18/2023     ANNUAL REVIEW OF HM ORDERS  10/14/2023     BMP  06/26/2024     LIPID  06/26/2024     MICROALBUMIN  06/26/2024     FALL RISK ASSESSMENT  06/30/2024     ADVANCE CARE PLANNING  06/30/2028     DTAP/TDAP/TD IMMUNIZATION (3 - Td or Tdap) 10/15/2028     COLORECTAL CANCER SCREENING  08/14/2029     HEPATITIS C SCREENING  Completed     PHQ-2 (once per calendar year)  Completed     INFLUENZA VACCINE  Completed     Pneumococcal Vaccine: 65+ Years  Completed     ZOSTER IMMUNIZATION  Completed     AORTIC ANEURYSM SCREENING (SYSTEM ASSIGNED)  Completed     COVID-19 Vaccine  Completed     IPV IMMUNIZATION  Aged Out      "MENINGITIS IMMUNIZATION  Aged Out               Review of Systems  Complete review of systems is obtained.  Other than the specific considerations noted above complete review of systems is negative.        OBJECTIVE:   /76   Pulse 62   Temp 98.6  F (37  C)   Resp 18   Ht 1.791 m (5' 10.5\")   Wt 103.9 kg (229 lb)   SpO2 96%   BMI 32.39 kg/m   Estimated body mass index is 32.39 kg/m  as calculated from the following:    Height as of this encounter: 1.791 m (5' 10.5\").    Weight as of this encounter: 103.9 kg (229 lb).  Physical Exam        General Appearance:    Alert, cooperative, no distress   Eyes:   No scleral icterus or conjunctival irritation       Ears:    Normal TM's and external ear canals, both ears   Throat:   Lips, mucosa, and tongue normal; teeth and gums normal   Neck:   Supple, symmetrical, trachea midline, no adenopathy;        thyroid:  No enlargement/tenderness/nodules   Lungs:     Clear to auscultation bilaterally, respirations unlabored, no wheezesor crackles   Heart:    Regular rate and rhythm,  No murmur   Abdomen:    Soft, no distention, no tenderness on palpation, no masses, no organomegaly     Extremities:  No edema, no jointswelling or redness, no evidence of any injuries, palpable dorsalis pedis pulses, palpable posterior tibialis pulses on the left foot. On the right foot I don't palpate distinct pulsations but the foot is warm with good capillary refill and nothing else to suggest the concern for any vascular disease..  No ulcerations.  No significant callus formation.  No other foot deformities.   Skin:  Noconcerning skin findings, no suspicious moles, no rashes   Neurologic:  On gross examination there is no motor or sensory deficit.  Specific examination of the feet using the monofilament line revealsthat there is no absence of sensation.  Patient walks with a normal gait       ASSESSMENT / PLAN:   Evaristo was seen today for recheck medication and wellness " "visit.    Diagnoses and all orders for this visit:    Routine general medical examination at a health care facility    Hyperlipidemia, unspecified hyperlipidemia type  -     atorvastatin (LIPITOR) 40 MG tablet; Take 1 tablet (40 mg) by mouth daily    Type 2 diabetes mellitus without complication, without long-term current use of insulin (H)  -     atorvastatin (LIPITOR) 40 MG tablet; Take 1 tablet (40 mg) by mouth daily    Gout, unspecified cause, unspecified chronicity, unspecified site  -     allopurinol (ZYLOPRIM) 100 MG tablet; Take 2 tablets (200 mg) by mouth daily           Patient has been advised of split billing requirements and indicates understanding: Yes  Answers for HPI/ROS submitted by the patient on 6/23/2023  In general, how would you rate your overall physical health?: excellent  Frequency of exercise:: 6-7 days/week  Do you usually eat at least 4 servings of fruit and vegetables a day, include whole grains & fiber, and avoid regularly eating high fat or \"junk\" foods? : Yes  Taking medications regularly:: Yes  Medication side effects:: None  Activities of Daily Living: no assistance needed  Home safety: no safety concerns identified  Hearing Impairment:: no hearing concerns  In the past 6 months, have you been bothered by leaking of urine?: No  In general, how would you rate your overall mental or emotional health?: excellent  Additional concerns today:: No  Duration of exercise:: Greater than 60 minutes        COUNSELING:  Reviewed preventive health counseling, as reflected in patient instructions       Regular exercise       Healthy diet/nutrition       Vision screening       Dental care       Colon cancer screening       Prostate cancer screening      BMI:   Estimated body mass index is 32.39 kg/m  as calculated from the following:    Height as of this encounter: 1.791 m (5' 10.5\").    Weight as of this encounter: 103.9 kg (229 lb).   Weight management plan: Discussed healthy diet and exercise " guidelines      He reports that he has never smoked. He has never used smokeless tobacco.      Appropriate preventive services were discussed with this patient, including applicable screening as appropriate for cardiovascular disease, diabetes, osteopenia/osteoporosis, and glaucoma.  As appropriate for age/gender, discussed screening for colorectal cancer, prostate cancer, breast cancer, and cervical cancer. Checklist reviewing preventive services available has been given to the patient.    Reviewed patients plan of care and provided an AVS. The Basic Care Plan (routine screening as documented in Health Maintenance) for Evaristo meets the Care Plan requirement. This Care Plan has been established and reviewed with the Patient.      Guillermo Wharton MD, MD  [facility]    Identified Health Risks:       Wt Readings from Last 3 Encounters:   06/30/23 103.9 kg (229 lb)   10/14/22 107.5 kg (237 lb)   09/09/22 108.4 kg (239 lb)        BP Readings from Last 6 Encounters:   06/30/23 132/76   10/14/22 134/76   09/09/22 (!) 150/76   07/01/22 128/76   07/02/21 124/66   08/28/20 134/76        Hemoglobin A1C   Date Value Ref Range Status   04/25/2023 6.5 (H) 0.0 - 5.6 % Final     Comment:     Normal <5.7%   Prediabetes 5.7-6.4%    Diabetes 6.5% or higher     Note: Adopted from ADA consensus guidelines.   10/14/2022 6.4 (H) 0.0 - 5.6 % Final     Comment:     Normal <5.7%   Prediabetes 5.7-6.4%    Diabetes 6.5% or higher     Note: Adopted from ADA consensus guidelines.   07/01/2022 6.5 (H) 0.0 - 5.6 % Final     Comment:     Normal <5.7%   Prediabetes 5.7-6.4%    Diabetes 6.5% or higher     Note: Adopted from ADA consensus guidelines.

## 2023-06-30 NOTE — PATIENT INSTRUCTIONS
Patient Education   Personalized Prevention Plan  You are due for the preventive services outlined below.  Your care team is available to assist you in scheduling these services.  If you have already completed any of these items, please share that information with your care team to update in your medical record.  Health Maintenance Due   Topic Date Due     Diabetic Foot Exam  Never done

## 2023-08-12 ENCOUNTER — HEALTH MAINTENANCE LETTER (OUTPATIENT)
Age: 73
End: 2023-08-12

## 2023-08-23 ENCOUNTER — TRANSFERRED RECORDS (OUTPATIENT)
Dept: HEALTH INFORMATION MANAGEMENT | Facility: CLINIC | Age: 73
End: 2023-08-23
Payer: COMMERCIAL

## 2023-08-23 LAB — RETINOPATHY: NEGATIVE

## 2023-11-15 ENCOUNTER — LAB (OUTPATIENT)
Dept: LAB | Facility: CLINIC | Age: 73
End: 2023-11-15
Payer: COMMERCIAL

## 2023-11-15 DIAGNOSIS — E11.9 TYPE 2 DIABETES MELLITUS WITHOUT COMPLICATION, WITHOUT LONG-TERM CURRENT USE OF INSULIN (H): ICD-10-CM

## 2023-11-15 LAB
ANION GAP SERPL CALCULATED.3IONS-SCNC: 10 MMOL/L (ref 7–15)
BUN SERPL-MCNC: 20.2 MG/DL (ref 8–23)
CALCIUM SERPL-MCNC: 9.2 MG/DL (ref 8.8–10.2)
CHLORIDE SERPL-SCNC: 104 MMOL/L (ref 98–107)
CHOLEST SERPL-MCNC: 171 MG/DL
CREAT SERPL-MCNC: 1.04 MG/DL (ref 0.67–1.17)
DEPRECATED HCO3 PLAS-SCNC: 28 MMOL/L (ref 22–29)
EGFRCR SERPLBLD CKD-EPI 2021: 77 ML/MIN/1.73M2
GLUCOSE SERPL-MCNC: 144 MG/DL (ref 70–99)
HBA1C MFR BLD: 6.3 % (ref 0–5.6)
HDLC SERPL-MCNC: 46 MG/DL
LDLC SERPL CALC-MCNC: 101 MG/DL
NONHDLC SERPL-MCNC: 125 MG/DL
POTASSIUM SERPL-SCNC: 5 MMOL/L (ref 3.4–5.3)
SODIUM SERPL-SCNC: 142 MMOL/L (ref 135–145)
TRIGL SERPL-MCNC: 119 MG/DL

## 2023-11-15 PROCEDURE — 36415 COLL VENOUS BLD VENIPUNCTURE: CPT

## 2023-11-15 PROCEDURE — 80048 BASIC METABOLIC PNL TOTAL CA: CPT

## 2023-11-15 PROCEDURE — 83036 HEMOGLOBIN GLYCOSYLATED A1C: CPT

## 2023-11-15 PROCEDURE — 80061 LIPID PANEL: CPT

## 2024-03-03 ENCOUNTER — HEALTH MAINTENANCE LETTER (OUTPATIENT)
Age: 74
End: 2024-03-03

## 2024-03-07 ENCOUNTER — MYC MEDICAL ADVICE (OUTPATIENT)
Dept: FAMILY MEDICINE | Facility: CLINIC | Age: 74
End: 2024-03-07
Payer: COMMERCIAL

## 2024-03-07 DIAGNOSIS — Z12.5 SCREENING FOR PROSTATE CANCER: ICD-10-CM

## 2024-03-07 DIAGNOSIS — E78.5 HYPERLIPIDEMIA, UNSPECIFIED HYPERLIPIDEMIA TYPE: ICD-10-CM

## 2024-03-07 DIAGNOSIS — E11.9 TYPE 2 DIABETES MELLITUS WITHOUT COMPLICATION, WITHOUT LONG-TERM CURRENT USE OF INSULIN (H): ICD-10-CM

## 2024-03-07 DIAGNOSIS — M10.9 GOUT, UNSPECIFIED CAUSE, UNSPECIFIED CHRONICITY, UNSPECIFIED SITE: Primary | ICD-10-CM

## 2024-04-22 ENCOUNTER — LAB (OUTPATIENT)
Dept: LAB | Facility: CLINIC | Age: 74
End: 2024-04-22
Payer: COMMERCIAL

## 2024-04-22 DIAGNOSIS — M10.9 GOUT, UNSPECIFIED CAUSE, UNSPECIFIED CHRONICITY, UNSPECIFIED SITE: ICD-10-CM

## 2024-04-22 DIAGNOSIS — E11.9 TYPE 2 DIABETES MELLITUS WITHOUT COMPLICATION, WITHOUT LONG-TERM CURRENT USE OF INSULIN (H): ICD-10-CM

## 2024-04-22 DIAGNOSIS — Z12.5 SCREENING FOR PROSTATE CANCER: ICD-10-CM

## 2024-04-22 LAB
ANION GAP SERPL CALCULATED.3IONS-SCNC: 11 MMOL/L (ref 7–15)
BUN SERPL-MCNC: 20.5 MG/DL (ref 8–23)
CALCIUM SERPL-MCNC: 9.5 MG/DL (ref 8.8–10.2)
CHLORIDE SERPL-SCNC: 101 MMOL/L (ref 98–107)
CREAT SERPL-MCNC: 1.17 MG/DL (ref 0.67–1.17)
CREAT UR-MCNC: 92.9 MG/DL
DEPRECATED HCO3 PLAS-SCNC: 27 MMOL/L (ref 22–29)
EGFRCR SERPLBLD CKD-EPI 2021: 67 ML/MIN/1.73M2
GLUCOSE SERPL-MCNC: 186 MG/DL (ref 70–99)
HBA1C MFR BLD: 7.7 % (ref 0–5.6)
MICROALBUMIN UR-MCNC: <12 MG/L
MICROALBUMIN/CREAT UR: NORMAL MG/G{CREAT}
POTASSIUM SERPL-SCNC: 4.8 MMOL/L (ref 3.4–5.3)
PSA SERPL DL<=0.01 NG/ML-MCNC: 2.96 NG/ML (ref 0–6.5)
SODIUM SERPL-SCNC: 139 MMOL/L (ref 135–145)
URATE SERPL-MCNC: 4.9 MG/DL (ref 3.4–7)

## 2024-04-22 PROCEDURE — 36415 COLL VENOUS BLD VENIPUNCTURE: CPT

## 2024-04-22 PROCEDURE — 82043 UR ALBUMIN QUANTITATIVE: CPT

## 2024-04-22 PROCEDURE — 82570 ASSAY OF URINE CREATININE: CPT

## 2024-04-22 PROCEDURE — 83036 HEMOGLOBIN GLYCOSYLATED A1C: CPT

## 2024-04-22 PROCEDURE — 80048 BASIC METABOLIC PNL TOTAL CA: CPT

## 2024-04-22 PROCEDURE — 84550 ASSAY OF BLOOD/URIC ACID: CPT

## 2024-04-22 PROCEDURE — G0103 PSA SCREENING: HCPCS

## 2024-04-29 ENCOUNTER — TRANSFERRED RECORDS (OUTPATIENT)
Dept: HEALTH INFORMATION MANAGEMENT | Facility: CLINIC | Age: 74
End: 2024-04-29
Payer: COMMERCIAL

## 2024-04-29 LAB — RETINOPATHY: NEGATIVE

## 2024-06-24 PROBLEM — E11.9 TYPE 2 DIABETES MELLITUS WITHOUT COMPLICATIONS (H): Status: ACTIVE | Noted: 2024-06-24

## 2024-06-24 RX ORDER — PHENOL 1.4 %
AEROSOL, SPRAY (ML) MUCOUS MEMBRANE PRN
COMMUNITY

## 2024-06-24 RX ORDER — AMITRIPTYLINE HYDROCHLORIDE 10 MG/1
TABLET ORAL
COMMUNITY
Start: 2023-08-22 | End: 2024-06-28

## 2024-06-27 SDOH — HEALTH STABILITY: PHYSICAL HEALTH: ON AVERAGE, HOW MANY DAYS PER WEEK DO YOU ENGAGE IN MODERATE TO STRENUOUS EXERCISE (LIKE A BRISK WALK)?: 7 DAYS

## 2024-06-27 SDOH — HEALTH STABILITY: PHYSICAL HEALTH: ON AVERAGE, HOW MANY MINUTES DO YOU ENGAGE IN EXERCISE AT THIS LEVEL?: 110 MIN

## 2024-06-27 ASSESSMENT — SOCIAL DETERMINANTS OF HEALTH (SDOH): HOW OFTEN DO YOU GET TOGETHER WITH FRIENDS OR RELATIVES?: ONCE A WEEK

## 2024-07-01 ENCOUNTER — OFFICE VISIT (OUTPATIENT)
Dept: FAMILY MEDICINE | Facility: CLINIC | Age: 74
End: 2024-07-01
Payer: COMMERCIAL

## 2024-07-01 VITALS
HEART RATE: 57 BPM | WEIGHT: 238.5 LBS | TEMPERATURE: 97.9 F | HEIGHT: 71 IN | SYSTOLIC BLOOD PRESSURE: 136 MMHG | BODY MASS INDEX: 33.39 KG/M2 | OXYGEN SATURATION: 98 % | DIASTOLIC BLOOD PRESSURE: 76 MMHG | RESPIRATION RATE: 18 BRPM

## 2024-07-01 DIAGNOSIS — Z00.00 ENCOUNTER FOR MEDICARE ANNUAL WELLNESS EXAM: Primary | ICD-10-CM

## 2024-07-01 DIAGNOSIS — M79.672 LEFT FOOT PAIN: ICD-10-CM

## 2024-07-01 DIAGNOSIS — E11.9 TYPE 2 DIABETES MELLITUS WITHOUT COMPLICATION, WITHOUT LONG-TERM CURRENT USE OF INSULIN (H): ICD-10-CM

## 2024-07-01 DIAGNOSIS — G47.33 OSA (OBSTRUCTIVE SLEEP APNEA): ICD-10-CM

## 2024-07-01 DIAGNOSIS — M10.9 GOUT, UNSPECIFIED CAUSE, UNSPECIFIED CHRONICITY, UNSPECIFIED SITE: ICD-10-CM

## 2024-07-01 DIAGNOSIS — E78.5 HYPERLIPIDEMIA, UNSPECIFIED HYPERLIPIDEMIA TYPE: ICD-10-CM

## 2024-07-01 DIAGNOSIS — M35.3 POLYMYALGIA (H): ICD-10-CM

## 2024-07-01 LAB
ANION GAP SERPL CALCULATED.3IONS-SCNC: 9 MMOL/L (ref 7–15)
BUN SERPL-MCNC: 18.4 MG/DL (ref 8–23)
CALCIUM SERPL-MCNC: 9.8 MG/DL (ref 8.8–10.2)
CHLORIDE SERPL-SCNC: 103 MMOL/L (ref 98–107)
CHOLEST SERPL-MCNC: 180 MG/DL
CREAT SERPL-MCNC: 1.07 MG/DL (ref 0.67–1.17)
DEPRECATED HCO3 PLAS-SCNC: 28 MMOL/L (ref 22–29)
EGFRCR SERPLBLD CKD-EPI 2021: 74 ML/MIN/1.73M2
FASTING STATUS PATIENT QL REPORTED: YES
FASTING STATUS PATIENT QL REPORTED: YES
GLUCOSE SERPL-MCNC: 139 MG/DL (ref 70–99)
HDLC SERPL-MCNC: 47 MG/DL
LDLC SERPL CALC-MCNC: 99 MG/DL
NONHDLC SERPL-MCNC: 133 MG/DL
POTASSIUM SERPL-SCNC: 4.3 MMOL/L (ref 3.4–5.3)
SODIUM SERPL-SCNC: 140 MMOL/L (ref 135–145)
TRIGL SERPL-MCNC: 172 MG/DL

## 2024-07-01 PROCEDURE — 99214 OFFICE O/P EST MOD 30 MIN: CPT | Mod: 25 | Performed by: FAMILY MEDICINE

## 2024-07-01 PROCEDURE — 80061 LIPID PANEL: CPT | Performed by: FAMILY MEDICINE

## 2024-07-01 PROCEDURE — G0439 PPPS, SUBSEQ VISIT: HCPCS | Performed by: FAMILY MEDICINE

## 2024-07-01 PROCEDURE — 36415 COLL VENOUS BLD VENIPUNCTURE: CPT | Performed by: FAMILY MEDICINE

## 2024-07-01 PROCEDURE — 80048 BASIC METABOLIC PNL TOTAL CA: CPT | Performed by: FAMILY MEDICINE

## 2024-07-01 RX ORDER — LANCETS
EACH MISCELLANEOUS
Qty: 100 EACH | Refills: 5 | Status: SHIPPED | OUTPATIENT
Start: 2024-07-01 | End: 2024-09-29

## 2024-07-01 RX ORDER — METFORMIN HCL 500 MG
500 TABLET, EXTENDED RELEASE 24 HR ORAL
Qty: 90 TABLET | Refills: 3 | Status: SHIPPED | OUTPATIENT
Start: 2024-07-01

## 2024-07-01 RX ORDER — BLOOD-GLUCOSE METER
1 EACH MISCELLANEOUS PRN
Qty: 1 KIT | Refills: 0 | Status: SHIPPED | OUTPATIENT
Start: 2024-07-01 | End: 2024-09-29

## 2024-07-01 RX ORDER — BLOOD SUGAR DIAGNOSTIC
STRIP MISCELLANEOUS
Qty: 100 STRIP | Refills: 5 | Status: SHIPPED | OUTPATIENT
Start: 2024-07-01

## 2024-07-01 ASSESSMENT — ACTIVITIES OF DAILY LIVING (ADL)
CONCENTRATING,_REMEMBERING_OR_MAKING_DECISIONS_DIFFICULTY: NO
DRESSING/BATHING_DIFFICULTY: NO
DIFFICULTY_COMMUNICATING: NO
CHANGE_IN_FUNCTIONAL_STATUS_SINCE_ONSET_OF_CURRENT_ILLNESS/INJURY: NO
TOILETING_ISSUES: NO
FALL_HISTORY_WITHIN_LAST_SIX_MONTHS: NO
DOING_ERRANDS_INDEPENDENTLY_DIFFICULTY: NO
DIFFICULTY_EATING/SWALLOWING: NO
WEAR_GLASSES_OR_BLIND: YES
HEARING_DIFFICULTY_OR_DEAF: NO
WALKING_OR_CLIMBING_STAIRS_DIFFICULTY: NO

## 2024-07-01 ASSESSMENT — PAIN SCALES - GENERAL: PAINLEVEL: NO PAIN (0)

## 2024-07-01 NOTE — PROGRESS NOTES
Preventive Care Visit  [facility]  Guillermo Wharton MD, MD, Family Medicine  Jul 1, 2024      Evaristo was seen today for recheck medication and wellness visit.    Diagnoses and all orders for this visit:    Encounter for Medicare annual wellness exam    Type 2 diabetes mellitus without complication, without long-term current use of insulin (H)  -     metFORMIN (GLUCOPHAGE XR) 500 MG 24 hr tablet; Take 1 tablet (500 mg) by mouth daily (with dinner)  -     Basic metabolic panel; Future  -     Lipid panel reflex to direct LDL Fasting; Future  -     Basic metabolic panel  -     Lipid panel reflex to direct LDL Fasting  -     Blood Glucose Monitoring Suppl (ACCU-CHEK GUIDE ME) w/Device KIT; 1 each as needed  -     ACCU-CHEK GUIDE test strip; Use to test blood sugar 1 daily.  -     blood glucose monitoring (SOFTCLIX) lancets; Use to test blood sugar 1 times daily.    Gout, unspecified cause, unspecified chronicity, unspecified site    Hyperlipidemia, unspecified hyperlipidemia type    Polymyalgia (H24)    MANUEL (obstructive sleep apnea)    Left foot pain           Subjective   Evaristo is a 74 year old, presenting for the following:  Recheck Medication and Wellness Visit    He has type 2 diabetes has historically been diet controlled.  Recent A1c has increased.  Patient reports he is working diligently to try to follow a diet conducive for managing diabetes along with getting regular exercise.  His exercise was a little bit hyper this past winter due to foot pain issues.    We talked about starting medication treatment with metformin.  We will begin with extended release 500 mg once daily.    He is up-to-date with eye exam no history of retinopathy.  Foot exam is performed today no neuropathy.  No history of vascular disease.  On appropriate medications for risk reduction.  Recent increase of atorvastatin led to possibly some myalgia type concerns in both the upper and lower part of the body.  He had some  more focal foot pain with some uncertainty he does have a history of gout unlikely to be gout as a cause he did have some plantar fasciitis and there may have been some other concerns that contributed to foot pain.  We talked about the potential that the increased dose of statin causing muscle pain elected to stop statin until he returns from vacation about 3 weeks and then decide if symptoms resolve and then consider starting a statin that is less likely to cause side effect concerns.  He is agreeable to this process.    He has obstructive sleep apnea uses a CPAP.  Continues to follow with specialist at [facility].  Had tried amitriptyline to help with sleep and was not real effective.  Does occasionally use diphenhydramine discussed the potential dangers of that.    Reviewed routine health prevention including immunizations recommend pneumococcal 20 vaccine which she will get at pharmacy.  He will come due for a colonoscopy in August.  This          7/1/2024     9:10 AM   Additional Questions   Roomed by am [facility]         Health Care Directive  Patient does not have a Health Care Directive or Living Will: Advance Directive received and scanned. Click on Code in the patient header to view.      Do you have a current opioid prescription? no  Do you use any other controlled substances or medications that are not prescribed by a provider? no             6/27/2024   General Health   How would you rate your overall physical health? Good   Feel stress (tense, anxious, or unable to sleep) Not at all            6/27/2024   Nutrition   Diet: Regular (no restrictions)    Diabetic       Multiple values from one day are sorted in reverse-chronological order         6/27/2024   Exercise   Days per week of moderate/strenous exercise 7 days   Average minutes spent exercising at this level 110 min            6/27/2024   Social Factors   Frequency of gathering with friends or relatives Once a week   Worry food won't  last until get money to buy more No   Food not last or not have enough money for food? No   Do you have housing? (Housing is defined as stable permanent housing and does not include staying ouside in a car, in a tent, in an abandoned building, in an overnight shelter, or couch-surfing.) Yes   Are you worried about losing your housing? No   Lack of transportation? No   Unable to get utilities (heat,electricity)? No            7/1/2024   Fall Risk   Fallen 2 or more times in the past year? No    No   Trouble with walking or balance? No    No       Multiple values from one day are sorted in reverse-chronological order          6/27/2024   Activities of Daily Living- Home Safety   Needs help with the following daily activites None of the above   Safety concerns in the home None of the above            6/27/2024   Dental   Dentist two times every year? Yes            6/27/2024   Hearing Screening   Hearing concerns? None of the above            6/27/2024   Driving Risk Screening   Patient/family members have concerns about driving No            6/27/2024   General Alertness/Fatigue Screening   Have you been more tired than usual lately? (!) YES            6/27/2024   Urinary Incontinence Screening   Bothered by leaking urine in past 6 months No            6/27/2024   TB Screening   Were you born outside of the [place]? No            Today's PHQ-2 Score:       7/1/2024     7:53 AM   PHQ-2 ( 1999 [employer])   Q1: Little interest or pleasure in doing things 0   Q2: Feeling down, depressed or hopeless 0   PHQ-2 Score 0   Q1: Little interest or pleasure in doing things Not at all   Q2: Feeling down, depressed or hopeless Not at all   PHQ-2 Score 0           6/27/2024   Substance Use   Alcohol more than 3/day or more than 7/wk No   Do you have a current opioid prescription? No   How severe/bad is pain from 1 to 10? 0/10 (No Pain)   Do you use any other substances recreationally? No        Social History     Tobacco Use    Smoking  status: Never    Smokeless tobacco: Never   Vaping Use    Vaping status: Never Used   Substance Use Topics    Alcohol use: Yes     Comment: Alcoholic Drinks/day: rarely    Drug use: No           6/27/2024   AAA Screening   Family history of Abdominal Aortic Aneurysm (AAA)? No      ASCVD Risk   The 10-year ASCVD risk score (Kristina DALEY, et al., 2019) is: 38%    Values used to calculate the score:      Age: 74 years      Sex: Male      Is Non- : No      Diabetic: Yes      Tobacco smoker: No      Systolic Blood Pressure: 136 mmHg      Is BP treated: No      HDL Cholesterol: 46 mg/dL      Total Cholesterol: 171 mg/dL            Reviewed and updated as needed this visit by Provider                      Current providers sharing in care for this patient include:  Patient Care Team:  Guillermo Wharton MD as PCP - General (Family Practice)  Guillermo Wharton MD as Assigned PCP    The following health maintenance items are reviewed in Epic and correct as of today:  Health Maintenance   Topic Date Due    DIABETIC FOOT EXAM  Never done    ANNUAL REVIEW OF HM ORDERS  10/14/2023    MEDICARE ANNUAL WELLNESS VISIT  06/30/2024    A1C  07/22/2024    INFLUENZA VACCINE (1) 09/01/2024    COVID-19 Vaccine (8 - 2023-24 season) 09/16/2024    LIPID  11/15/2024    BMP  04/22/2025    MICROALBUMIN  04/22/2025    EYE EXAM  04/29/2025    FALL RISK ASSESSMENT  07/01/2025    DTAP/TDAP/TD IMMUNIZATION (3 - Td or Tdap) 10/15/2028    ADVANCE CARE PLANNING  07/01/2029    COLORECTAL CANCER SCREENING  08/14/2029    HEPATITIS C SCREENING  Completed    PHQ-2 (once per calendar year)  Completed    Pneumococcal Vaccine: 65+ Years  Completed    ZOSTER IMMUNIZATION  Completed    RSV VACCINE (Pregnancy & 60+)  Completed    IPV IMMUNIZATION  Aged Out    HPV IMMUNIZATION  Aged Out    MENINGITIS IMMUNIZATION  Aged Out    RSV MONOCLONAL ANTIBODY  Aged Out       Complete review of systems is obtained.  Other than the specific  "considerations noted above complete review of systems is negative.       Objective    Exam  /76   Pulse 57   Temp 97.9  F (36.6  C)   Resp 18   Ht 1.791 m (5' 10.5\")   Wt 108.2 kg (238 lb 8 oz)   SpO2 98%   BMI 33.74 kg/m     Estimated body mass index is 33.74 kg/m  as calculated from the following:    Height as of this encounter: 1.791 m (5' 10.5\").    Weight as of this encounter: 108.2 kg (238 lb 8 oz).    Physical Exam        General Appearance:    Alert, cooperative, no distress   Eyes:   No scleral icterus or conjunctival irritation       Ears:    Normal TM's and external ear canals, both ears   Throat:   Lips, mucosa, and tongue normal; teeth and gums normal   Neck:   Supple, symmetrical, trachea midline, no adenopathy;        thyroid:  No enlargement/tenderness/nodules   Lungs:     Clear to auscultation bilaterally, respirations unlabored, no wheezesor crackles   Heart:    Regular rate and rhythm,  No murmur   Abdomen:    Soft, no distention, no tenderness on palpation, no masses, no organomegaly     Extremities:  No edema, no jointswelling or redness, no evidence of any injuries, palpable dorsalis pedis pulses, palpable posterior tibialis pulses.  No ulcerations.  No significant callus formation.  No other foot deformities.   Skin:  Noconcerning skin findings, no suspicious moles, no rashes   Neurologic:  On gross examination there is no motor or sensory deficit.  Specific examination of the feet using the monofilament line revealsthat there is no absence of sensation.  Patient walks with a normal gait                   7/1/2024   Mini Cog   Clock Draw Score 2 Normal   3 Item Recall 3 objects recalled   Mini Cog Total Score 5                 Signed Electronically by: Guillermo Wharton MD, MD    "

## 2024-07-12 DIAGNOSIS — E78.5 HYPERLIPIDEMIA, UNSPECIFIED HYPERLIPIDEMIA TYPE: Primary | ICD-10-CM

## 2024-07-14 RX ORDER — ATORVASTATIN CALCIUM 40 MG/1
40 TABLET, FILM COATED ORAL DAILY
Qty: 90 TABLET | Refills: 3 | Status: SHIPPED | OUTPATIENT
Start: 2024-07-14

## 2024-07-22 ENCOUNTER — MYC MEDICAL ADVICE (OUTPATIENT)
Dept: FAMILY MEDICINE | Facility: CLINIC | Age: 74
End: 2024-07-22
Payer: COMMERCIAL

## 2024-07-22 DIAGNOSIS — E11.9 TYPE 2 DIABETES MELLITUS WITHOUT COMPLICATION, WITHOUT LONG-TERM CURRENT USE OF INSULIN (H): Primary | ICD-10-CM

## 2024-07-22 DIAGNOSIS — Z12.11 COLON CANCER SCREENING: ICD-10-CM

## 2024-07-23 RX ORDER — ROSUVASTATIN CALCIUM 5 MG/1
5 TABLET, COATED ORAL DAILY
Qty: 90 TABLET | Refills: 3 | Status: SHIPPED | OUTPATIENT
Start: 2024-07-23 | End: 2024-10-07

## 2024-08-07 DIAGNOSIS — M10.9 GOUT, UNSPECIFIED CAUSE, UNSPECIFIED CHRONICITY, UNSPECIFIED SITE: ICD-10-CM

## 2024-08-07 RX ORDER — ALLOPURINOL 100 MG/1
200 TABLET ORAL DAILY
Qty: 180 TABLET | Refills: 3 | Status: SHIPPED | OUTPATIENT
Start: 2024-08-07

## 2024-08-13 ENCOUNTER — OFFICE VISIT (OUTPATIENT)
Dept: EDUCATION SERVICES | Facility: CLINIC | Age: 74
End: 2024-08-13
Attending: FAMILY MEDICINE
Payer: COMMERCIAL

## 2024-08-13 DIAGNOSIS — E11.9 TYPE 2 DIABETES MELLITUS WITHOUT COMPLICATION, WITHOUT LONG-TERM CURRENT USE OF INSULIN (H): ICD-10-CM

## 2024-08-13 PROCEDURE — G0108 DIAB MANAGE TRN  PER INDIV: HCPCS | Performed by: DIETITIAN, REGISTERED

## 2024-08-13 NOTE — LETTER
8/13/2024         RE: Evaristo Lindsey  [address] [address]        Dear Colleague,    Thank you for referring your patient, Evaristo Lindsey, to the [facility]. Please see a copy of my visit note below.    Diabetes Self-Management Education & Support    Presents for: Individual review    Type of Service: In Person Visit      ASSESSMENT:  Patient presents to clinic today to review blood glucose, medications, and to assess/assist with current diabetes self management skills. Patient arrived today with his wife. Medications were reviewed and patient confirmed they are currently taking 500 mg Metformin XR (started July 2024) with no missed or skipped doses. Patient is currently using a personal glucometer to monitor their blood glucose (see reports below).     BG RECORDS: 125, 107, 111, 129, 135, 94, 100, 118, 125, 142, 141, 166, 109, 137, 136, 104    Intake was reviewed and patient is currently eating 3 meals and 1-2 snacks per day which appear relatively balanced and in a timely fashion. See diet history below. Areas of opportunity include balancing CHO and PRO intake each time eating, carbohydrate counting.   Activity is excellent with patient walking 5-6 miles every morning before breakfast. Reviewed possibility of benefit to post-meal activity if/when able.    DIET HISTORY: 1x/wk out to dinner  Breakfast: 1/2 cup BB, almond milk, egg white muffin with WW flat bread x1 + cranberry juice (SF)  Lunch: left overs - turkey tenderloin, sloppy joes WW x2 + salad + chips x 1 cup, tacos, hamburger, lasagna x2 cup, meatloaf + broccoli, mashed potato (less), chicken with wild rice mix <1 cup  Snack: Boom Breana Pop x 3 - kettle corn, almonds  Dinner: balanced plate - salad, meat, roll  Snack: frozen yogurt occ  Beverages: water, Coke Zero    Prior to todays appointment I reviewed their most recent office visit notes as well as lab results.    Patient's most recent    Lab Results   Component Value Date    A1C 7.7 04/22/2024    A1C 6.3 11/15/2023    A1C 6.5 04/25/2023    A1C 6.4 10/14/2022    A1C 6.5 07/01/2022    is not meeting goal of <7.0    Diabetes knowledge and skills assessment:   Patient is knowledgeable in diabetes management concepts related to: review in all    Continue education with the following diabetes management concepts: Healthy Eating, Being Active, Monitoring, Taking Medication, Problem Solving, Reducing Risks, and Healthy Coping    Based on learning assessment above, most appropriate setting for further diabetes education would be: Group class or Individual setting.      PLAN  1. Eat 3 balanced meals each day - Monitor carbohydrate intake and aim for:  60-75 grams per meal or 4-5 carbohydrate choices ... 1 choice = 15 grams   For snacks, aim for 15-30 grams of carbohydrate or 1-2 choices.     2. Do not wait longer than 4-5 hours to eat something during awake hours. Try to include a protein source with each meal and snack - this has been shown to help with blood sugar elevations.  Simple protein choices: nuts, seeds, peanut butter, cheese stick, sliced cheese, cottage cheese, hard boiled egg, cold cut deli meat, Greek yogurt    3. Check your blood sugar 1 time per day (fasting, 2 hours after eating).  Your blood sugar goals:  mg/dL before eating and  mg/dL 2 hours after eating (or per your doctor).   Always bring your blood sugar log and meter to your diabetes-related appointments.    4. Incorporate 30 minutes of activity into most days of the week (150 minutes per week is ideal) - it does not need to be all at one time & walking counts!     5. Take diabetes medications as prescribed: continue 500 mg Metformin XR once daily with food    Recheck A1c at any time.    Topics to cover at upcoming visits: Healthy Eating, Being Active, Monitoring, Taking Medication, Problem Solving, Reducing Risks, and Healthy Coping    Follow-up: 6 months    See Care  "Plan for co-developed, patient-state behavior change goals.  AVS provided for patient today.    Education Materials Provided:  Living Healthy with Diabetes and Carbohydrate Counting      SUBJECTIVE/OBJECTIVE:  Presents for: Individual review  Accompanied by: Spouse  Diabetes education in the past 24mo: No  Focus of Visit: Patient Unsure  Diabetes type: Type 2  Disease course: Stable  Transportation concerns: No  Difficulty affording diabetes medication?: No  Difficulty affording diabetes testing supplies?: No  Other concerns:: None  Cultural Influences/Ethnic Background:  Not  or     Diabetes Symptoms & Complications:  Weight trend: Stable  Disease course: Stable  Complications assessed today?: No    Patient Problem List and Family Medical History reviewed for relevant medical history, current medical status, and diabetes risk factors.    Vitals:  There were no vitals taken for this visit.  Estimated body mass index is 33.74 kg/m  as calculated from the following:    Height as of 7/1/24: 1.791 m (5' 10.5\").    Weight as of 7/1/24: 108.2 kg (238 lb 8 oz).   Last 3 BP:   BP Readings from Last 3 Encounters:   07/01/24 136/76   06/30/23 132/76   10/14/22 134/76     History   Smoking Status     Never   Smokeless Tobacco     Never     Labs:  Lab Results   Component Value Date    A1C 7.7 04/22/2024     Lab Results   Component Value Date     07/01/2024     07/02/2021     Lab Results   Component Value Date    LDL 99 07/01/2024     10/07/2014     Direct Measure HDL   Date Value Ref Range Status   07/01/2024 47 >=40 mg/dL Final     GFR Estimate   Date Value Ref Range Status   07/01/2024 74 >60 mL/min/1.73m2 Final     Comment:     eGFR calculated using 2021 CKD-EPI equation.   07/02/2021 >60 >60 mL/min/1.73m2 Final     GFR Estimate If Black   Date Value Ref Range Status   07/02/2021 >60 >60 mL/min/1.73m2 Final     Lab Results   Component Value Date    CR 1.07 07/01/2024     No results found " "for: \"MICROALBUMIN\"    Healthy Eating:  Healthy Eating Assessed Today: Yes  Cultural/Judaism diet restrictions?: No  Do you have any food allergies or intolerances?: No  Meal planning/habits: Avoiding sweets, Heart healthy  Who cooks/prepares meals for you?: Self, Spouse  Who purchases food in  your home?: Self, Spouse  How many times a week on average do you eat food made away from home (restaurant/take-out)?: 2  Meals include: Breakfast, Dinner, Lunch, Afternoon Snack  Beverages: Water, Diet soda, Juice (SF juice)  Has patient met with a dietitian in the past?: Yes (2014)    Being Active:  Being Active Assessed Today: Yes  Exercise:: Yes  Days per week of moderate to strenuous exercise (like a brisk walk): 7  On average, minutes per day of exercise at this level: 90  How intense was your typical exercise? : Light (like stretching or slow walking)  Exercise Minutes per Week: 630  Barrier to exercise: None    Monitoring:  Monitoring Assessed Today: Yes  Did patient bring glucose meter to appointment? : Yes  Blood Glucose Meter: Accu-chek  Times checking blood sugar at home (number): 1  Times checking blood sugar at home (per): Day  Blood glucose trend: Decreasing    Taking Medications:  Diabetes Medication(s)       Biguanides       metFORMIN (GLUCOPHAGE XR) 500 MG 24 hr tablet Take 1 tablet (500 mg) by mouth daily (with dinner)          Taking Medication Assessed Today: Yes  Current Treatments: Oral Medication (taken by mouth)  Problems taking diabetes medications regularly?: No  Diabetes medication side effects?: No    Problem Solving:  Problem Solving Assessed Today: Yes  Is the patient at risk for hypoglycemia?: No  Is the patient at risk for DKA?: No  Does patient have severe weather/disaster plan for diabetes management?: Not Needed  Does patient have sick day plan for diabetes management?: Not Needed    Reducing Risks:  Reducing Risks Assessed Today: No    Healthy Coping:  Healthy Coping Assessed Today: " Yes  Emotional response to diabetes: Ready to learn  Informal Support system:: Spouse, Family  Stage of change: PREPARATION (Decided to change - considering how)    Care Plan and Education Provided:  Healthy Eating: Balanced meals, Carbohydrate Counting, Consistency in amount and timing of carbohydrate intake, and Label reading, Being Active: Finding a physical activity routine that works for you and Relationship of activity to glucose, Monitoring: Blood glucose versus Continuous Glucose Monitoring, Frequency of monitoring, Individual glucose targets, Log and interpret results, and Purpose, Taking Medication: Action of prescribed medication(s), Problem Solving: Low glucose - causes, signs/symptoms, treatment and prevention, Reducing Risks: Complications of diabetes and Goal for A1c, how it relates to glucose and how often to check, and Healthy Coping: Benefits of making appropriate lifestyle changes and Utilize support systems    DARLYN Mendoza, MARYN, LD, CDCES  Diabetes     [phone]  Diabetes Education Care Team: [phone]    Time Spent: 63 minutes  Encounter Type: Individual    Any diabetes medication dose changes were made via the CDE Protocol per the patient's referring provider. A copy of this encounter was shared with the provider.

## 2024-08-13 NOTE — PROGRESS NOTES
Diabetes Self-Management Education & Support    Presents for: Individual review    Type of Service: In Person Visit      ASSESSMENT:  Patient presents to clinic today to review blood glucose, medications, and to assess/assist with current diabetes self management skills. Patient arrived today with his wife. Medications were reviewed and patient confirmed they are currently taking 500 mg Metformin XR (started July 2024) with no missed or skipped doses. Patient is currently using a personal glucometer to monitor their blood glucose (see reports below).     BG RECORDS: 125, 107, 111, 129, 135, 94, 100, 118, 125, 142, 141, 166, 109, 137, 136, 104    Intake was reviewed and patient is currently eating 3 meals and 1-2 snacks per day which appear relatively balanced and in a timely fashion. See diet history below. Areas of opportunity include balancing CHO and PRO intake each time eating, carbohydrate counting.   Activity is excellent with patient walking 5-6 miles every morning before breakfast. Reviewed possibility of benefit to post-meal activity if/when able.    DIET HISTORY: 1x/wk out to dinner  Breakfast: 1/2 cup BB, almond milk, egg white muffin with WW flat bread x1 + cranberry juice (SF)  Lunch: left overs - turkey tenderloin, sloppy joes WW x2 + salad + chips x 1 cup, tacos, hamburger, lasagna x2 cup, meatloaf + broccoli, mashed potato (less), chicken with wild rice mix <1 cup  Snack: Boom Breana Pop x 3 - kettle corn, almonds  Dinner: balanced plate - salad, meat, roll  Snack: frozen yogurt occ  Beverages: water, Coke Zero    Prior to todays appointment I reviewed their most recent office visit notes as well as lab results.    Patient's most recent   Lab Results   Component Value Date    A1C 7.7 04/22/2024    A1C 6.3 11/15/2023    A1C 6.5 04/25/2023    A1C 6.4 10/14/2022    A1C 6.5 07/01/2022    is not meeting goal of <7.0    Diabetes knowledge and skills assessment:   Patient is knowledgeable in diabetes  management concepts related to: review in all    Continue education with the following diabetes management concepts: Healthy Eating, Being Active, Monitoring, Taking Medication, Problem Solving, Reducing Risks, and Healthy Coping    Based on learning assessment above, most appropriate setting for further diabetes education would be: Group class or Individual setting.      PLAN  1. Eat 3 balanced meals each day - Monitor carbohydrate intake and aim for:  60-75 grams per meal or 4-5 carbohydrate choices ... 1 choice = 15 grams   For snacks, aim for 15-30 grams of carbohydrate or 1-2 choices.     2. Do not wait longer than 4-5 hours to eat something during awake hours. Try to include a protein source with each meal and snack - this has been shown to help with blood sugar elevations.  Simple protein choices: nuts, seeds, peanut butter, cheese stick, sliced cheese, cottage cheese, hard boiled egg, cold cut deli meat, Greek yogurt    3. Check your blood sugar 1 time per day (fasting, 2 hours after eating).  Your blood sugar goals:  mg/dL before eating and  mg/dL 2 hours after eating (or per your doctor).   Always bring your blood sugar log and meter to your diabetes-related appointments.    4. Incorporate 30 minutes of activity into most days of the week (150 minutes per week is ideal) - it does not need to be all at one time & walking counts!     5. Take diabetes medications as prescribed: continue 500 mg Metformin XR once daily with food    Recheck A1c at any time.    Topics to cover at upcoming visits: Healthy Eating, Being Active, Monitoring, Taking Medication, Problem Solving, Reducing Risks, and Healthy Coping    Follow-up: 6 months    See Care Plan for co-developed, patient-state behavior change goals.  AVS provided for patient today.    Education Materials Provided:  Living Healthy with Diabetes and Carbohydrate Counting      SUBJECTIVE/OBJECTIVE:  Presents for: Individual review  Accompanied by:  "Spouse  Diabetes education in the past 24mo: No  Focus of Visit: Patient Unsure  Diabetes type: Type 2  Disease course: Stable  Transportation concerns: No  Difficulty affording diabetes medication?: No  Difficulty affording diabetes testing supplies?: No  Other concerns:: None  Cultural Influences/Ethnic Background:  Not  or     Diabetes Symptoms & Complications:  Weight trend: Stable  Disease course: Stable  Complications assessed today?: No    Patient Problem List and Family Medical History reviewed for relevant medical history, current medical status, and diabetes risk factors.    Vitals:  There were no vitals taken for this visit.  Estimated body mass index is 33.74 kg/m  as calculated from the following:    Height as of 7/1/24: 1.791 m (5' 10.5\").    Weight as of 7/1/24: 108.2 kg (238 lb 8 oz).   Last 3 BP:   BP Readings from Last 3 Encounters:   07/01/24 136/76   06/30/23 132/76   10/14/22 134/76     History   Smoking Status    Never   Smokeless Tobacco    Never     Labs:  Lab Results   Component Value Date    A1C 7.7 04/22/2024     Lab Results   Component Value Date     07/01/2024     07/02/2021     Lab Results   Component Value Date    LDL 99 07/01/2024     10/07/2014     Direct Measure HDL   Date Value Ref Range Status   07/01/2024 47 >=40 mg/dL Final     GFR Estimate   Date Value Ref Range Status   07/01/2024 74 >60 mL/min/1.73m2 Final     Comment:     eGFR calculated using 2021 CKD-EPI equation.   07/02/2021 >60 >60 mL/min/1.73m2 Final     GFR Estimate If Black   Date Value Ref Range Status   07/02/2021 >60 >60 mL/min/1.73m2 Final     Lab Results   Component Value Date    CR 1.07 07/01/2024     No results found for: \"MICROALBUMIN\"    Healthy Eating:  Healthy Eating Assessed Today: Yes  Cultural/Taoism diet restrictions?: No  Do you have any food allergies or intolerances?: No  Meal planning/habits: Avoiding sweets, Heart healthy  Who cooks/prepares meals for you?: " Self, Spouse  Who purchases food in  your home?: Self, Spouse  How many times a week on average do you eat food made away from home (restaurant/take-out)?: 2  Meals include: Breakfast, Dinner, Lunch, Afternoon Snack  Beverages: Water, Diet soda, Juice (SF juice)  Has patient met with a dietitian in the past?: Yes (2014)    Being Active:  Being Active Assessed Today: Yes  Exercise:: Yes  Days per week of moderate to strenuous exercise (like a brisk walk): 7  On average, minutes per day of exercise at this level: 90  How intense was your typical exercise? : Light (like stretching or slow walking)  Exercise Minutes per Week: 630  Barrier to exercise: None    Monitoring:  Monitoring Assessed Today: Yes  Did patient bring glucose meter to appointment? : Yes  Blood Glucose Meter: Accu-chek  Times checking blood sugar at home (number): 1  Times checking blood sugar at home (per): Day  Blood glucose trend: Decreasing    Taking Medications:  Diabetes Medication(s)       Biguanides       metFORMIN (GLUCOPHAGE XR) 500 MG 24 hr tablet Take 1 tablet (500 mg) by mouth daily (with dinner)          Taking Medication Assessed Today: Yes  Current Treatments: Oral Medication (taken by mouth)  Problems taking diabetes medications regularly?: No  Diabetes medication side effects?: No    Problem Solving:  Problem Solving Assessed Today: Yes  Is the patient at risk for hypoglycemia?: No  Is the patient at risk for DKA?: No  Does patient have severe weather/disaster plan for diabetes management?: Not Needed  Does patient have sick day plan for diabetes management?: Not Needed    Reducing Risks:  Reducing Risks Assessed Today: No    Healthy Coping:  Healthy Coping Assessed Today: Yes  Emotional response to diabetes: Ready to learn  Informal Support system:: Spouse, Family  Stage of change: PREPARATION (Decided to change - considering how)    Care Plan and Education Provided:  Healthy Eating: Balanced meals, Carbohydrate Counting,  Consistency in amount and timing of carbohydrate intake, and Label reading, Being Active: Finding a physical activity routine that works for you and Relationship of activity to glucose, Monitoring: Blood glucose versus Continuous Glucose Monitoring, Frequency of monitoring, Individual glucose targets, Log and interpret results, and Purpose, Taking Medication: Action of prescribed medication(s), Problem Solving: Low glucose - causes, signs/symptoms, treatment and prevention, Reducing Risks: Complications of diabetes and Goal for A1c, how it relates to glucose and how often to check, and Healthy Coping: Benefits of making appropriate lifestyle changes and Utilize support systems    Yuko Ward, DARLYN, RDN, LD, CDCES  Diabetes     [phone]  Diabetes Education Care Team: [phone]    Time Spent: 63 minutes  Encounter Type: Individual    Any diabetes medication dose changes were made via the CDE Protocol per the patient's referring provider. A copy of this encounter was shared with the provider.

## 2024-08-13 NOTE — PATIENT INSTRUCTIONS
APPOINTMENT REMINDERS:    1. Eat 3 balanced meals each day - Monitor carbohydrate intake and aim for:  60-75 grams per meal or 4-5 carbohydrate choices ... 1 choice = 15 grams   For snacks, aim for 15-30 grams of carbohydrate or 1-2 choices.     2. Do not wait longer than 4-5 hours to eat something during awake hours. Try to include a protein source with each meal and snack - this has been shown to help with blood sugar elevations.  Simple protein choices: nuts, seeds, peanut butter, cheese stick, sliced cheese, cottage cheese, hard boiled egg, cold cut deli meat, Greek yogurt    3. Check your blood sugar 1 time per day (fasting, 2 hours after eating).  Your blood sugar goals:  mg/dL before eating and  mg/dL 2 hours after eating (or per your doctor).   Always bring your blood sugar log and meter to your diabetes-related appointments.    4. Incorporate 30 minutes of activity into most days of the week (150 minutes per week is ideal) - it does not need to be all at one time & walking counts!     5. Take diabetes medications as prescribed: continue 500 mg Metformin XR once daily with food      I truly appreciate your feedback on our appointment together. You will either receive an email, text message, or mailing survey about today's appointment. Please fill this out at your soonest convenience so I can continue to improve upon my practice. Thank you!      Contact Information:  Yuko Ward, DARLYN, RDN, LD, [facility]  Diabetes     [phone]  Diabetes Education Care Team: [phone]

## 2024-08-15 ENCOUNTER — DOCUMENTATION ONLY (OUTPATIENT)
Dept: OTHER | Facility: CLINIC | Age: 74
End: 2024-08-15
Payer: COMMERCIAL

## 2024-08-19 ENCOUNTER — TRANSFERRED RECORDS (OUTPATIENT)
Dept: HEALTH INFORMATION MANAGEMENT | Facility: CLINIC | Age: 74
End: 2024-08-19
Payer: COMMERCIAL

## 2024-09-27 ENCOUNTER — MYC MEDICAL ADVICE (OUTPATIENT)
Dept: FAMILY MEDICINE | Facility: CLINIC | Age: 74
End: 2024-09-27
Payer: COMMERCIAL

## 2024-09-27 DIAGNOSIS — E11.9 TYPE 2 DIABETES MELLITUS WITHOUT COMPLICATION, WITHOUT LONG-TERM CURRENT USE OF INSULIN (H): ICD-10-CM

## 2024-09-29 ENCOUNTER — HEALTH MAINTENANCE LETTER (OUTPATIENT)
Age: 74
End: 2024-09-29

## 2024-09-29 RX ORDER — LANCETS
EACH MISCELLANEOUS
Qty: 100 EACH | Refills: 5 | Status: SHIPPED | OUTPATIENT
Start: 2024-09-29

## 2024-09-29 RX ORDER — BLOOD-GLUCOSE METER
1 EACH MISCELLANEOUS PRN
Qty: 1 KIT | Refills: 0 | Status: SHIPPED | OUTPATIENT
Start: 2024-09-29

## 2024-10-01 ENCOUNTER — LAB (OUTPATIENT)
Dept: LAB | Facility: CLINIC | Age: 74
End: 2024-10-01
Payer: COMMERCIAL

## 2024-10-01 DIAGNOSIS — E11.9 TYPE 2 DIABETES MELLITUS WITHOUT COMPLICATION, WITHOUT LONG-TERM CURRENT USE OF INSULIN (H): ICD-10-CM

## 2024-10-01 DIAGNOSIS — E78.5 HYPERLIPIDEMIA, UNSPECIFIED HYPERLIPIDEMIA TYPE: ICD-10-CM

## 2024-10-01 LAB
ALBUMIN SERPL BCG-MCNC: 4.2 G/DL (ref 3.5–5.2)
ALP SERPL-CCNC: 79 U/L (ref 40–150)
ALT SERPL W P-5'-P-CCNC: 21 U/L (ref 0–70)
ANION GAP SERPL CALCULATED.3IONS-SCNC: 8 MMOL/L (ref 7–15)
AST SERPL W P-5'-P-CCNC: 33 U/L (ref 0–45)
BILIRUB SERPL-MCNC: 0.4 MG/DL
BUN SERPL-MCNC: 18.2 MG/DL (ref 8–23)
CALCIUM SERPL-MCNC: 9.1 MG/DL (ref 8.8–10.4)
CHLORIDE SERPL-SCNC: 104 MMOL/L (ref 98–107)
CHOLEST SERPL-MCNC: 182 MG/DL
CREAT SERPL-MCNC: 1.2 MG/DL (ref 0.67–1.17)
EGFRCR SERPLBLD CKD-EPI 2021: 64 ML/MIN/1.73M2
EST. AVERAGE GLUCOSE BLD GHB EST-MCNC: 143 MG/DL
FASTING STATUS PATIENT QL REPORTED: YES
FASTING STATUS PATIENT QL REPORTED: YES
GLUCOSE SERPL-MCNC: 140 MG/DL (ref 70–99)
HBA1C MFR BLD: 6.6 % (ref 0–5.6)
HCO3 SERPL-SCNC: 28 MMOL/L (ref 22–29)
HDLC SERPL-MCNC: 38 MG/DL
LDLC SERPL CALC-MCNC: 96 MG/DL
NONHDLC SERPL-MCNC: 144 MG/DL
POTASSIUM SERPL-SCNC: 4.8 MMOL/L (ref 3.4–5.3)
PROT SERPL-MCNC: 6.6 G/DL (ref 6.4–8.3)
SODIUM SERPL-SCNC: 140 MMOL/L (ref 135–145)
TRIGL SERPL-MCNC: 241 MG/DL

## 2024-10-01 PROCEDURE — 36415 COLL VENOUS BLD VENIPUNCTURE: CPT

## 2024-10-01 PROCEDURE — 80053 COMPREHEN METABOLIC PANEL: CPT

## 2024-10-01 PROCEDURE — 80061 LIPID PANEL: CPT

## 2024-10-01 PROCEDURE — 83036 HEMOGLOBIN GLYCOSYLATED A1C: CPT

## 2025-01-18 ENCOUNTER — HEALTH MAINTENANCE LETTER (OUTPATIENT)
Age: 75
End: 2025-01-18

## 2025-03-31 PROBLEM — K64.9 HEMORRHOIDS: Status: ACTIVE | Noted: 2024-08-19

## 2025-03-31 PROBLEM — D12.4 BENIGN NEOPLASM OF DESCENDING COLON: Status: ACTIVE | Noted: 2019-08-16

## 2025-03-31 PROBLEM — K63.5 POLYP OF COLON: Status: ACTIVE | Noted: 2019-08-14

## 2025-03-31 PROBLEM — K57.30 DIVERTICULAR DISEASE OF LARGE INTESTINE: Status: ACTIVE | Noted: 2019-08-14

## 2025-03-31 PROBLEM — Z86.0100 HISTORY OF COLONIC POLYPS: Status: ACTIVE | Noted: 2024-08-19

## 2025-03-31 RX ORDER — AMITRIPTYLINE HYDROCHLORIDE 10 MG/1
10 TABLET ORAL EVERY 24 HOURS
COMMUNITY
Start: 2024-08-19

## 2025-04-04 SDOH — HEALTH STABILITY: PHYSICAL HEALTH: ON AVERAGE, HOW MANY DAYS PER WEEK DO YOU ENGAGE IN MODERATE TO STRENUOUS EXERCISE (LIKE A BRISK WALK)?: 7 DAYS

## 2025-04-04 SDOH — HEALTH STABILITY: PHYSICAL HEALTH: ON AVERAGE, HOW MANY MINUTES DO YOU ENGAGE IN EXERCISE AT THIS LEVEL?: 120 MIN

## 2025-04-04 ASSESSMENT — SOCIAL DETERMINANTS OF HEALTH (SDOH): HOW OFTEN DO YOU GET TOGETHER WITH FRIENDS OR RELATIVES?: ONCE A WEEK

## 2025-04-08 ENCOUNTER — OFFICE VISIT (OUTPATIENT)
Dept: FAMILY MEDICINE | Facility: CLINIC | Age: 75
End: 2025-04-08
Payer: COMMERCIAL

## 2025-04-08 VITALS
WEIGHT: 235.1 LBS | DIASTOLIC BLOOD PRESSURE: 78 MMHG | RESPIRATION RATE: 20 BRPM | HEIGHT: 71 IN | HEART RATE: 53 BPM | SYSTOLIC BLOOD PRESSURE: 134 MMHG | BODY MASS INDEX: 32.91 KG/M2 | TEMPERATURE: 98.5 F

## 2025-04-08 DIAGNOSIS — G47.33 OBSTRUCTIVE SLEEP APNEA: ICD-10-CM

## 2025-04-08 DIAGNOSIS — E78.5 HYPERLIPIDEMIA, UNSPECIFIED HYPERLIPIDEMIA TYPE: ICD-10-CM

## 2025-04-08 DIAGNOSIS — M10.9 GOUT, UNSPECIFIED CAUSE, UNSPECIFIED CHRONICITY, UNSPECIFIED SITE: ICD-10-CM

## 2025-04-08 DIAGNOSIS — Z00.00 MEDICARE ANNUAL WELLNESS VISIT, SUBSEQUENT: Primary | ICD-10-CM

## 2025-04-08 DIAGNOSIS — Z12.5 SCREENING FOR PROSTATE CANCER: ICD-10-CM

## 2025-04-08 DIAGNOSIS — Z86.0100 HISTORY OF COLONIC POLYPS: ICD-10-CM

## 2025-04-08 DIAGNOSIS — E11.9 TYPE 2 DIABETES MELLITUS WITHOUT COMPLICATION, WITHOUT LONG-TERM CURRENT USE OF INSULIN (H): ICD-10-CM

## 2025-04-08 LAB
ALBUMIN SERPL BCG-MCNC: 4.4 G/DL (ref 3.5–5.2)
ALP SERPL-CCNC: 81 U/L (ref 40–150)
ALT SERPL W P-5'-P-CCNC: 18 U/L (ref 0–70)
ANION GAP SERPL CALCULATED.3IONS-SCNC: 10 MMOL/L (ref 7–15)
AST SERPL W P-5'-P-CCNC: 22 U/L (ref 0–45)
BILIRUB SERPL-MCNC: 0.3 MG/DL
BUN SERPL-MCNC: 18 MG/DL (ref 8–23)
CALCIUM SERPL-MCNC: 9.4 MG/DL (ref 8.8–10.4)
CHLORIDE SERPL-SCNC: 104 MMOL/L (ref 98–107)
CHOLEST SERPL-MCNC: 177 MG/DL
CREAT SERPL-MCNC: 1.11 MG/DL (ref 0.67–1.17)
CREAT UR-MCNC: 57.7 MG/DL
EGFRCR SERPLBLD CKD-EPI 2021: 71 ML/MIN/1.73M2
ERYTHROCYTE [DISTWIDTH] IN BLOOD BY AUTOMATED COUNT: 13 % (ref 10–15)
EST. AVERAGE GLUCOSE BLD GHB EST-MCNC: 134 MG/DL
GLUCOSE SERPL-MCNC: 134 MG/DL (ref 70–99)
HBA1C MFR BLD: 6.3 % (ref 0–5.6)
HCO3 SERPL-SCNC: 27 MMOL/L (ref 22–29)
HCT VFR BLD AUTO: 43.2 % (ref 40–53)
HDLC SERPL-MCNC: 46 MG/DL
HGB BLD-MCNC: 14.3 G/DL (ref 13.3–17.7)
LDLC SERPL CALC-MCNC: 102 MG/DL
MCH RBC QN AUTO: 30.4 PG (ref 26.5–33)
MCHC RBC AUTO-ENTMCNC: 33.1 G/DL (ref 31.5–36.5)
MCV RBC AUTO: 92 FL (ref 78–100)
MICROALBUMIN UR-MCNC: <12 MG/L
MICROALBUMIN/CREAT UR: NORMAL MG/G{CREAT}
NONHDLC SERPL-MCNC: 131 MG/DL
PLATELET # BLD AUTO: 171 10E3/UL (ref 150–450)
POTASSIUM SERPL-SCNC: 4.9 MMOL/L (ref 3.4–5.3)
PROT SERPL-MCNC: 6.6 G/DL (ref 6.4–8.3)
PSA SERPL DL<=0.01 NG/ML-MCNC: 2.53 NG/ML (ref 0–6.5)
RBC # BLD AUTO: 4.7 10E6/UL (ref 4.4–5.9)
SODIUM SERPL-SCNC: 141 MMOL/L (ref 135–145)
TRIGL SERPL-MCNC: 147 MG/DL
URATE SERPL-MCNC: 4.5 MG/DL (ref 3.4–7)
WBC # BLD AUTO: 5 10E3/UL (ref 4–11)

## 2025-04-08 PROCEDURE — 83036 HEMOGLOBIN GLYCOSYLATED A1C: CPT | Performed by: FAMILY MEDICINE

## 2025-04-08 PROCEDURE — 85027 COMPLETE CBC AUTOMATED: CPT | Performed by: FAMILY MEDICINE

## 2025-04-08 PROCEDURE — G0103 PSA SCREENING: HCPCS | Performed by: FAMILY MEDICINE

## 2025-04-08 PROCEDURE — 80053 COMPREHEN METABOLIC PANEL: CPT | Performed by: FAMILY MEDICINE

## 2025-04-08 PROCEDURE — 84550 ASSAY OF BLOOD/URIC ACID: CPT | Performed by: FAMILY MEDICINE

## 2025-04-08 PROCEDURE — 36415 COLL VENOUS BLD VENIPUNCTURE: CPT | Performed by: FAMILY MEDICINE

## 2025-04-08 PROCEDURE — 82570 ASSAY OF URINE CREATININE: CPT | Performed by: FAMILY MEDICINE

## 2025-04-08 PROCEDURE — 82043 UR ALBUMIN QUANTITATIVE: CPT | Performed by: FAMILY MEDICINE

## 2025-04-08 PROCEDURE — 80061 LIPID PANEL: CPT | Performed by: FAMILY MEDICINE

## 2025-04-08 ASSESSMENT — ACTIVITIES OF DAILY LIVING (ADL)
DRESSING/BATHING_DIFFICULTY: NO
TOILETING_ISSUES: NO
HEARING_DIFFICULTY_OR_DEAF: NO
CHANGE_IN_FUNCTIONAL_STATUS_SINCE_ONSET_OF_CURRENT_ILLNESS/INJURY: NO
CONCENTRATING,_REMEMBERING_OR_MAKING_DECISIONS_DIFFICULTY: NO
WALKING_OR_CLIMBING_STAIRS_DIFFICULTY: NO
DIFFICULTY_COMMUNICATING: NO
WEAR_GLASSES_OR_BLIND: YES
FALL_HISTORY_WITHIN_LAST_SIX_MONTHS: NO
DOING_ERRANDS_INDEPENDENTLY_DIFFICULTY: NO
DIFFICULTY_EATING/SWALLOWING: NO

## 2025-04-08 ASSESSMENT — PAIN SCALES - GENERAL: PAINLEVEL_OUTOF10: NO PAIN (0)

## 2025-04-08 NOTE — PROGRESS NOTES
Preventive Care Visit  [facility]  Guillermo Wharton MD, MD, Family Medicine  Apr 8, 2025      Evaristo was seen today for recheck medication, diabetes and wellness visit.    Diagnoses and all orders for this visit:    Medicare annual wellness visit, subsequent    Type 2 diabetes mellitus without complication, without long-term current use of insulin (H)  -     Comprehensive metabolic panel (BMP + Alb, Alk Phos, ALT, AST, Total. Bili, TP)  -     Hemoglobin A1c  -     CBC with platelets  -     Lipid panel reflex to direct LDL Fasting  -     Albumin Random Urine Quantitative with Creat Ratio; Future  -     Albumin Random Urine Quantitative with Creat Ratio    Hyperlipidemia, unspecified hyperlipidemia type  -     Lipid panel reflex to direct LDL Fasting    Gout, unspecified cause, unspecified chronicity, unspecified site  -     Uric acid    Obstructive sleep apnea    Screening for prostate cancer  -     PSA, screen    History of colonic polyps    Other orders  -     COVID-19 12+ (PFIZER)           Subjective   Evaristo is a 74 year old, presenting for the following:  Recheck Medication, Diabetes, and Wellness Visit    He has type 2 diabetes.  Is on metformin 500 mg extended release once daily.  He remains physically active.  He does done a lot of hiking he has created some foot pain issues with long hikes at times throughout the winter and is very worried about his feet.  We discussed this extensively today.  Does not have retinopathy.  Does not have peripheral vascular disease.  He is on appropriate medications for vascular disease risk reduction.  His blood pressure is reasonably controlled.  He has normal renal function without microalbuminuria.  He gets a routine eye exam once yearly typically in May.  He has no history of retinopathy.  His weight is down slightly.  He continues to work to follow a diet conducive for managing diabetes.    He has gout with suppressed uric acid level and no outbreak  for the past year.  Discussed remaining on preventive therapy and checking uric acid level.    He has dyslipidemia is on rosuvastatin, there was some question that he might have some reaction leading to muscle pain and stiffness more frequently on atorvastatin.  He reports no such concerns on his current dose of rosuvastatin and this will be his first cholesterol check since the change.    He has obstructive sleep apnea follows with the sleep specialist in the [facility].    We talked about screening for prostate cancer.  Reviewed colon cancer screening noting history of polyps and due again for screening this August 2025.  We talked about appropriate immunizations.  We talked about routine dental care and other aspects of health prevention previously mentioned.    Do you have a current opioid prescription? no  Do you use any other controlled substances or medications that are not prescribed by a provider?  no          Advance Care Planning  Patient has a Health Care Directive on file  Advance care planning document is on file and is current.      4/4/2025   General Health   How would you rate your overall physical health? Excellent   Feel stress (tense, anxious, or unable to sleep) Not at all         4/4/2025   Nutrition   Diet: Regular (no restrictions)    Diabetic       Multiple values from one day are sorted in reverse-chronological order         4/4/2025   Exercise   Days per week of moderate/strenous exercise 7 days   Average minutes spent exercising at this level 120 min         4/4/2025   Social Factors   Frequency of gathering with friends or relatives Once a week   Worry food won't last until get money to buy more No   Food not last or not have enough money for food? No   Do you have housing? (Housing is defined as stable permanent housing and does not include staying ouside in a car, in a tent, in an abandoned building, in an overnight shelter, or couch-surfing.) Yes   Are you worried about  losing your housing? No   Lack of transportation? No   Unable to get utilities (heat,electricity)? No         4/8/2025   Fall Risk   Fallen 2 or more times in the past year? No   Trouble with walking or balance? No          4/4/2025   Activities of Daily Living- Home Safety   Needs help with the following daily activites None of the above   Safety concerns in the home None of the above         4/4/2025   Dental   Dentist two times every year? Yes         4/4/2025   Hearing Screening   Hearing concerns? None of the above         4/4/2025   Driving Risk Screening   Patient/family members have concerns about driving No         4/4/2025   General Alertness/Fatigue Screening   Have you been more tired than usual lately? No         4/4/2025   Urinary Incontinence Screening   Bothered by leaking urine in past 6 months No           6/27/2024   TB Screening   Were you born outside of the [place]? No           Today's PHQ-2 Score:       4/8/2025     6:44 AM   PHQ-2 ( 1999 Pf[employer])   Q1: Little interest or pleasure in doing things 0   Q2: Feeling down, depressed or hopeless 0   PHQ-2 Score 0    Q1: Little interest or pleasure in doing things Not at all   Q2: Feeling down, depressed or hopeless Not at all   PHQ-2 Score 0       Patient-reported           4/4/2025   Substance Use   Alcohol more than 3/day or more than 7/wk No   Do you have a current opioid prescription? No   How severe/bad is pain from 1 to 10? 0/10 (No Pain)   Do you use any other substances recreationally? No     Social History     Tobacco Use    Smoking status: Never    Smokeless tobacco: Never   Vaping Use    Vaping status: Never Used   Substance Use Topics    Alcohol use: Yes     Comment: Alcoholic Drinks/day: rarely    Drug use: No           4/4/2025   AAA Screening   Family history of Abdominal Aortic Aneurysm (AAA)? No   ASCVD Risk   The 10-year ASCVD risk score (Kristina DALEY, et al., 2019) is: 40.5%    Values used to calculate the score:      Age: 74  "years      Sex: Male      Is Non- : No      Diabetic: Yes      Tobacco smoker: No      Systolic Blood Pressure: 134 mmHg      Is BP treated: No      HDL Cholesterol: 38 mg/dL      Total Cholesterol: 182 mg/dL            Reviewed and updated as needed this visit by Provider                      Current providers sharing in care for this patient include:  Patient Care Team:  Guillermo Wharton MD as PCP - General (Family Practice)  Guillermo Wharton MD as Assigned PCP  Yuko Ward RD as Diabetes Educator (Diabetes Education)    The following health maintenance items are reviewed in Epic and correct as of today:  Health Maintenance   Topic Date Due    DIABETIC FOOT EXAM  Never done    HEPATITIS B IMMUNIZATION (1 of 3 - Risk 3-dose series) Never done    ANNUAL REVIEW OF HM ORDERS  10/14/2023    MICROALBUMIN  04/22/2025    URIC ACID  04/22/2025    EYE EXAM  04/29/2025    MEDICARE ANNUAL WELLNESS VISIT  07/01/2025    A1C  07/08/2025    COLORECTAL CANCER SCREENING  08/19/2025    BMP  10/01/2025    LIPID  10/01/2025    FALL RISK ASSESSMENT  04/08/2026    DTAP/TDAP/TD IMMUNIZATION (3 - Td or Tdap) 10/15/2028    ADVANCE CARE PLANNING  04/08/2030    HEPATITIS C SCREENING  Completed    PHQ-2 (once per calendar year)  Completed    INFLUENZA VACCINE  Completed    Pneumococcal Vaccine: 50+ Years  Completed    ZOSTER IMMUNIZATION  Completed    HEPATITIS A IMMUNIZATION  Completed    RSV VACCINE  Completed    COVID-19 Vaccine  Completed    HPV IMMUNIZATION  Aged Out    MENINGITIS IMMUNIZATION  Aged Out       Complete review of systems is obtained.  Other than the specific considerations noted above complete review of systems is negative.       Objective    Exam  /78   Pulse 53   Temp 98.5  F (36.9  C) (Oral)   Resp 20   Ht 1.791 m (5' 10.5\")   Wt 106.6 kg (235 lb 1.6 oz)   BMI 33.26 kg/m     Estimated body mass index is 33.26 kg/m  as calculated from the following:    Height as of this " "encounter: 1.791 m (5' 10.5\").    Weight as of this encounter: 106.6 kg (235 lb 1.6 oz).    Wt Readings from Last 3 Encounters:   04/08/25 106.6 kg (235 lb 1.6 oz)   07/01/24 108.2 kg (238 lb 8 oz)   06/30/23 103.9 kg (229 lb)        BP Readings from Last 6 Encounters:   04/08/25 134/78   07/01/24 136/76   06/30/23 132/76   10/14/22 134/76   09/09/22 (!) 150/76   07/01/22 128/76        Hemoglobin A1C   Date Value Ref Range Status   04/08/2025 6.3 (H) 0.0 - 5.6 % Final     Comment:     Normal <5.7%   Prediabetes 5.7-6.4%    Diabetes 6.5% or higher     Note: Adopted from ADA consensus guidelines.   10/01/2024 6.6 (H) 0.0 - 5.6 % Final     Comment:     Normal <5.7%   Prediabetes 5.7-6.4%    Diabetes 6.5% or higher     Note: Adopted from ADA consensus guidelines.   04/22/2024 7.7 (H) 0.0 - 5.6 % Final     Comment:     Normal <5.7%   Prediabetes 5.7-6.4%    Diabetes 6.5% or higher     Note: Adopted from ADA consensus guidelines.        Physical Exam            General Appearance:    Alert, cooperative, no distress   Eyes:   No scleral icterus or conjunctival irritation       Ears:    Normal TM's and external ear canals, both ears   Throat:   Lips, mucosa, and tongue normal; teeth and gums normal   Neck:   Supple, symmetrical, trachea midline, no adenopathy;        thyroid:  No enlargement/tenderness/nodules   Lungs:     Clear to auscultation bilaterally, respirations unlabored, no wheezesor crackles   Heart:    Regular rate and rhythm,  No murmur   Abdomen:    Soft, no distention, no tenderness on palpation, no masses, no organomegaly     Extremities:  No edema, no jointswelling or redness, there are large blister lesions with skin intact on the bottom of the foot that appear to be in a state of healing, these were mentioned.  There is no sign of infection.  There are no other ulcerations or other concerns.  This is likely resultant from a long vigorous hike wearing shoes that were not fitting.  There is not a concern " that he has other foot pathology that would result in increasing problems or further problems.  We talked about striving to obtain appropriate fitting footwear., palpable dorsalis pedis pulses, palpable posterior tibialis pulses.  No ulcerations.  No significant callus formation.  No other foot deformities.   Skin:  Noconcerning skin findings, no suspicious moles, no rashes   Neurologic:  On gross examination there is no motor or sensory deficit.  Specific examination of the feet using the monofilament line reveals  that there is no absence of sensation.  Patient walks with a normal gait                   4/8/2025   Mini Cog   Clock Draw Score 2 Normal   3 Item Recall 3 objects recalled   Mini Cog Total Score 5         Vision Screen  Reason Vision Screen Not Completed: Screening Recommend: Patient/Guardian Declined      Signed Electronically by: Guillermo Wharton MD

## 2025-04-21 ENCOUNTER — TRANSFERRED RECORDS (OUTPATIENT)
Dept: HEALTH INFORMATION MANAGEMENT | Facility: CLINIC | Age: 75
End: 2025-04-21
Payer: COMMERCIAL

## 2025-04-21 LAB — RETINOPATHY: NEGATIVE

## 2025-06-24 DIAGNOSIS — E11.9 TYPE 2 DIABETES MELLITUS WITHOUT COMPLICATION, WITHOUT LONG-TERM CURRENT USE OF INSULIN (H): ICD-10-CM

## 2025-06-25 RX ORDER — METFORMIN HYDROCHLORIDE 500 MG/1
500 TABLET, EXTENDED RELEASE ORAL
Qty: 90 TABLET | Refills: 0 | Status: SHIPPED | OUTPATIENT
Start: 2025-06-25

## 2025-07-20 ENCOUNTER — HEALTH MAINTENANCE LETTER (OUTPATIENT)
Age: 75
End: 2025-07-20

## 2025-08-09 DIAGNOSIS — E11.9 TYPE 2 DIABETES MELLITUS WITHOUT COMPLICATION, WITHOUT LONG-TERM CURRENT USE OF INSULIN (H): ICD-10-CM

## 2025-08-11 RX ORDER — BLOOD SUGAR DIAGNOSTIC
STRIP MISCELLANEOUS
Qty: 100 STRIP | Refills: 2 | Status: SHIPPED | OUTPATIENT
Start: 2025-08-11

## 2025-09-02 ENCOUNTER — TRANSFERRED RECORDS (OUTPATIENT)
Dept: ADMINISTRATIVE | Facility: CLINIC | Age: 75
End: 2025-09-02
Payer: COMMERCIAL

## 2025-09-03 PROBLEM — D12.6 ADENOMATOUS POLYP OF COLON: Status: ACTIVE | Noted: 2025-09-03

## 2025-09-04 ENCOUNTER — PATIENT OUTREACH (OUTPATIENT)
Dept: GASTROENTEROLOGY | Facility: CLINIC | Age: 75
End: 2025-09-04
Payer: COMMERCIAL